# Patient Record
Sex: FEMALE | Race: AMERICAN INDIAN OR ALASKA NATIVE | NOT HISPANIC OR LATINO | URBAN - METROPOLITAN AREA
[De-identification: names, ages, dates, MRNs, and addresses within clinical notes are randomized per-mention and may not be internally consistent; named-entity substitution may affect disease eponyms.]

---

## 2017-03-25 ENCOUNTER — OUTPATIENT (OUTPATIENT)
Dept: OUTPATIENT SERVICES | Facility: HOSPITAL | Age: 82
LOS: 1 days | Discharge: HOME | End: 2017-03-25

## 2017-06-27 DIAGNOSIS — E78.2 MIXED HYPERLIPIDEMIA: ICD-10-CM

## 2017-06-27 DIAGNOSIS — Z13.0 ENCOUNTER FOR SCREENING FOR DISEASES OF THE BLOOD AND BLOOD-FORMING ORGANS AND CERTAIN DISORDERS INVOLVING THE IMMUNE MECHANISM: ICD-10-CM

## 2017-06-27 DIAGNOSIS — Z13.228 ENCOUNTER FOR SCREENING FOR OTHER METABOLIC DISORDERS: ICD-10-CM

## 2017-06-27 DIAGNOSIS — D50.9 IRON DEFICIENCY ANEMIA, UNSPECIFIED: ICD-10-CM

## 2017-06-27 DIAGNOSIS — E55.9 VITAMIN D DEFICIENCY, UNSPECIFIED: ICD-10-CM

## 2017-06-27 DIAGNOSIS — Z00.00 ENCOUNTER FOR GENERAL ADULT MEDICAL EXAMINATION WITHOUT ABNORMAL FINDINGS: ICD-10-CM

## 2017-06-27 DIAGNOSIS — E61.1 IRON DEFICIENCY: ICD-10-CM

## 2017-06-27 DIAGNOSIS — E11.9 TYPE 2 DIABETES MELLITUS WITHOUT COMPLICATIONS: ICD-10-CM

## 2017-06-27 DIAGNOSIS — Z13.220 ENCOUNTER FOR SCREENING FOR LIPOID DISORDERS: ICD-10-CM

## 2017-06-27 DIAGNOSIS — Z13.21 ENCOUNTER FOR SCREENING FOR NUTRITIONAL DISORDER: ICD-10-CM

## 2018-04-21 ENCOUNTER — OUTPATIENT (OUTPATIENT)
Dept: OUTPATIENT SERVICES | Facility: HOSPITAL | Age: 83
LOS: 1 days | Discharge: HOME | End: 2018-04-21

## 2018-04-21 DIAGNOSIS — R97.0 ELEVATED CARCINOEMBRYONIC ANTIGEN [CEA]: ICD-10-CM

## 2018-04-21 DIAGNOSIS — N39.0 URINARY TRACT INFECTION, SITE NOT SPECIFIED: ICD-10-CM

## 2018-04-21 DIAGNOSIS — Z13.0 ENCOUNTER FOR SCREENING FOR DISEASES OF THE BLOOD AND BLOOD-FORMING ORGANS AND CERTAIN DISORDERS INVOLVING THE IMMUNE MECHANISM: ICD-10-CM

## 2018-04-21 DIAGNOSIS — R80.9 PROTEINURIA, UNSPECIFIED: ICD-10-CM

## 2018-04-21 DIAGNOSIS — Z13.220 ENCOUNTER FOR SCREENING FOR LIPOID DISORDERS: ICD-10-CM

## 2018-04-21 DIAGNOSIS — Z13.21 ENCOUNTER FOR SCREENING FOR NUTRITIONAL DISORDER: ICD-10-CM

## 2018-04-21 DIAGNOSIS — E11.9 TYPE 2 DIABETES MELLITUS WITHOUT COMPLICATIONS: ICD-10-CM

## 2018-04-21 DIAGNOSIS — Z00.00 ENCOUNTER FOR GENERAL ADULT MEDICAL EXAMINATION WITHOUT ABNORMAL FINDINGS: ICD-10-CM

## 2019-03-12 ENCOUNTER — OUTPATIENT (OUTPATIENT)
Dept: OUTPATIENT SERVICES | Facility: HOSPITAL | Age: 84
LOS: 1 days | Discharge: HOME | End: 2019-03-12

## 2019-03-12 DIAGNOSIS — Z13.220 ENCOUNTER FOR SCREENING FOR LIPOID DISORDERS: ICD-10-CM

## 2019-03-12 DIAGNOSIS — R79.89 OTHER SPECIFIED ABNORMAL FINDINGS OF BLOOD CHEMISTRY: ICD-10-CM

## 2019-03-12 DIAGNOSIS — Z13.21 ENCOUNTER FOR SCREENING FOR NUTRITIONAL DISORDER: ICD-10-CM

## 2019-03-12 DIAGNOSIS — Z13.0 ENCOUNTER FOR SCREENING FOR DISEASES OF THE BLOOD AND BLOOD-FORMING ORGANS AND CERTAIN DISORDERS INVOLVING THE IMMUNE MECHANISM: ICD-10-CM

## 2019-03-12 DIAGNOSIS — E11.9 TYPE 2 DIABETES MELLITUS WITHOUT COMPLICATIONS: ICD-10-CM

## 2019-03-12 DIAGNOSIS — Z00.00 ENCOUNTER FOR GENERAL ADULT MEDICAL EXAMINATION WITHOUT ABNORMAL FINDINGS: ICD-10-CM

## 2019-03-12 DIAGNOSIS — R79.1 ABNORMAL COAGULATION PROFILE: ICD-10-CM

## 2019-03-12 DIAGNOSIS — N39.0 URINARY TRACT INFECTION, SITE NOT SPECIFIED: ICD-10-CM

## 2019-03-12 DIAGNOSIS — R94.6 ABNORMAL RESULTS OF THYROID FUNCTION STUDIES: ICD-10-CM

## 2019-03-12 DIAGNOSIS — R97.0 ELEVATED CARCINOEMBRYONIC ANTIGEN [CEA]: ICD-10-CM

## 2019-03-12 DIAGNOSIS — R73.09 OTHER ABNORMAL GLUCOSE: ICD-10-CM

## 2019-10-29 ENCOUNTER — OUTPATIENT (OUTPATIENT)
Dept: OUTPATIENT SERVICES | Facility: HOSPITAL | Age: 84
LOS: 1 days | Discharge: HOME | End: 2019-10-29

## 2019-10-29 DIAGNOSIS — N39.0 URINARY TRACT INFECTION, SITE NOT SPECIFIED: ICD-10-CM

## 2019-10-29 DIAGNOSIS — R82.90 UNSPECIFIED ABNORMAL FINDINGS IN URINE: ICD-10-CM

## 2020-04-07 ENCOUNTER — EMERGENCY (EMERGENCY)
Facility: HOSPITAL | Age: 85
LOS: 0 days | Discharge: HOME | End: 2020-04-07
Attending: EMERGENCY MEDICINE | Admitting: EMERGENCY MEDICINE
Payer: MEDICARE

## 2020-04-07 VITALS
HEART RATE: 96 BPM | DIASTOLIC BLOOD PRESSURE: 66 MMHG | OXYGEN SATURATION: 95 % | SYSTOLIC BLOOD PRESSURE: 132 MMHG | RESPIRATION RATE: 22 BRPM | TEMPERATURE: 101 F

## 2020-04-07 VITALS — OXYGEN SATURATION: 96 %

## 2020-04-07 DIAGNOSIS — I10 ESSENTIAL (PRIMARY) HYPERTENSION: ICD-10-CM

## 2020-04-07 DIAGNOSIS — R06.02 SHORTNESS OF BREATH: ICD-10-CM

## 2020-04-07 DIAGNOSIS — U07.1 COVID-19: ICD-10-CM

## 2020-04-07 DIAGNOSIS — E11.9 TYPE 2 DIABETES MELLITUS WITHOUT COMPLICATIONS: ICD-10-CM

## 2020-04-07 DIAGNOSIS — R55 SYNCOPE AND COLLAPSE: ICD-10-CM

## 2020-04-07 DIAGNOSIS — R50.9 FEVER, UNSPECIFIED: ICD-10-CM

## 2020-04-07 LAB
ALBUMIN SERPL ELPH-MCNC: 4.3 G/DL — SIGNIFICANT CHANGE UP (ref 3.5–5.2)
ALP SERPL-CCNC: 54 U/L — SIGNIFICANT CHANGE UP (ref 30–115)
ALT FLD-CCNC: 44 U/L — HIGH (ref 0–41)
ANION GAP SERPL CALC-SCNC: 15 MMOL/L — HIGH (ref 7–14)
AST SERPL-CCNC: 55 U/L — HIGH (ref 0–41)
BASOPHILS # BLD AUTO: 0.01 K/UL — SIGNIFICANT CHANGE UP (ref 0–0.2)
BASOPHILS NFR BLD AUTO: 0.2 % — SIGNIFICANT CHANGE UP (ref 0–1)
BILIRUB SERPL-MCNC: 0.3 MG/DL — SIGNIFICANT CHANGE UP (ref 0.2–1.2)
BUN SERPL-MCNC: 15 MG/DL — SIGNIFICANT CHANGE UP (ref 10–20)
CALCIUM SERPL-MCNC: 9.1 MG/DL — SIGNIFICANT CHANGE UP (ref 8.5–10.1)
CHLORIDE SERPL-SCNC: 96 MMOL/L — LOW (ref 98–110)
CO2 SERPL-SCNC: 24 MMOL/L — SIGNIFICANT CHANGE UP (ref 17–32)
CREAT SERPL-MCNC: 1.2 MG/DL — SIGNIFICANT CHANGE UP (ref 0.7–1.5)
EOSINOPHIL # BLD AUTO: 0.01 K/UL — SIGNIFICANT CHANGE UP (ref 0–0.7)
EOSINOPHIL NFR BLD AUTO: 0.2 % — SIGNIFICANT CHANGE UP (ref 0–8)
GLUCOSE SERPL-MCNC: 130 MG/DL — HIGH (ref 70–99)
HCT VFR BLD CALC: 34.4 % — LOW (ref 37–47)
HGB BLD-MCNC: 11.6 G/DL — LOW (ref 12–16)
IMM GRANULOCYTES NFR BLD AUTO: 0.4 % — HIGH (ref 0.1–0.3)
LYMPHOCYTES # BLD AUTO: 0.88 K/UL — LOW (ref 1.2–3.4)
LYMPHOCYTES # BLD AUTO: 19.2 % — LOW (ref 20.5–51.1)
MCHC RBC-ENTMCNC: 33.2 PG — HIGH (ref 27–31)
MCHC RBC-ENTMCNC: 33.7 G/DL — SIGNIFICANT CHANGE UP (ref 32–37)
MCV RBC AUTO: 98.6 FL — SIGNIFICANT CHANGE UP (ref 81–99)
MONOCYTES # BLD AUTO: 0.49 K/UL — SIGNIFICANT CHANGE UP (ref 0.1–0.6)
MONOCYTES NFR BLD AUTO: 10.7 % — HIGH (ref 1.7–9.3)
NEUTROPHILS # BLD AUTO: 3.18 K/UL — SIGNIFICANT CHANGE UP (ref 1.4–6.5)
NEUTROPHILS NFR BLD AUTO: 69.3 % — SIGNIFICANT CHANGE UP (ref 42.2–75.2)
NRBC # BLD: 0 /100 WBCS — SIGNIFICANT CHANGE UP (ref 0–0)
PLATELET # BLD AUTO: 155 K/UL — SIGNIFICANT CHANGE UP (ref 130–400)
POTASSIUM SERPL-MCNC: 4.1 MMOL/L — SIGNIFICANT CHANGE UP (ref 3.5–5)
POTASSIUM SERPL-SCNC: 4.1 MMOL/L — SIGNIFICANT CHANGE UP (ref 3.5–5)
PROT SERPL-MCNC: 7.4 G/DL — SIGNIFICANT CHANGE UP (ref 6–8)
RBC # BLD: 3.49 M/UL — LOW (ref 4.2–5.4)
RBC # FLD: 12.8 % — SIGNIFICANT CHANGE UP (ref 11.5–14.5)
SODIUM SERPL-SCNC: 135 MMOL/L — SIGNIFICANT CHANGE UP (ref 135–146)
TROPONIN T SERPL-MCNC: <0.01 NG/ML — SIGNIFICANT CHANGE UP
WBC # BLD: 4.59 K/UL — LOW (ref 4.8–10.8)
WBC # FLD AUTO: 4.59 K/UL — LOW (ref 4.8–10.8)

## 2020-04-07 PROCEDURE — 93010 ELECTROCARDIOGRAM REPORT: CPT

## 2020-04-07 PROCEDURE — 71045 X-RAY EXAM CHEST 1 VIEW: CPT | Mod: 26

## 2020-04-07 PROCEDURE — 99285 EMERGENCY DEPT VISIT HI MDM: CPT | Mod: CS

## 2020-04-07 RX ORDER — SODIUM CHLORIDE 9 MG/ML
500 INJECTION INTRAMUSCULAR; INTRAVENOUS; SUBCUTANEOUS ONCE
Refills: 0 | Status: COMPLETED | OUTPATIENT
Start: 2020-04-07 | End: 2020-04-07

## 2020-04-07 RX ADMIN — SODIUM CHLORIDE 500 MILLILITER(S): 9 INJECTION INTRAMUSCULAR; INTRAVENOUS; SUBCUTANEOUS at 12:03

## 2020-04-07 NOTE — ED ADULT NURSE NOTE - NSIMPLEMENTINTERV_GEN_ALL_ED
Implemented All Fall with Harm Risk Interventions:  [place] to call system. Call bell, personal items and telephone within reach. Instruct patient to call for assistance. Room bathroom lighting operational. Non-slip footwear when patient is off stretcher. Physically safe environment: no spills, clutter or unnecessary equipment. Stretcher in lowest position, wheels locked, appropriate side rails in place. Provide visual cue, wrist band, yellow gown, etc. Monitor gait and stability. Monitor for mental status changes and reorient to person, place, and time. Review medications for side effects contributing to fall risk. Reinforce activity limits and safety measures with patient and family. Provide visual clues: red socks.

## 2020-04-07 NOTE — ED PROVIDER NOTE - NSFOLLOWUPINSTRUCTIONS_ED_ALL_ED_FT
Please follow up with your Primary Care Doctor in 1-3 days to monitor progress of your symptoms.     Syncope    Syncope is when you temporarily lose consciousness, also called fainting or passing out. It is caused by a sudden decrease in blood flow to the brain. Even though most causes of syncope are not dangerous, syncope can be a sign of a serious medical problem. Signs that you may be about to faint include feeling dizzy, lightheaded, nauseas, visual changes, cold/clammy skin. Do not drive, use machinery, or play sports until your health care provider says it is okay.    SEEK IMMEDIATE MEDICAL CARE IF YOU HAVE THE FOLLOWING SYMPTOMS: severe headache, pain in your chest/abdomen/back, bleeding from your mouth or rectum, palpitations, shortness of breath, pain with breathing, seizure, confusion, trouble walking.    Viral Illness, Adult  Viruses are tiny germs that can get into a person's body and cause illness. There are many different types of viruses, and they cause many types of illness. Viral illnesses can range from mild to severe. They can affect various parts of the body.    Common illnesses that are caused by a virus include colds and the flu. Viral illnesses also include serious conditions such as HIV/AIDS (human immunodeficiency virus/acquired immunodeficiency syndrome). A few viruses have been linked to certain cancers.    What are the causes?  Many types of viruses can cause illness. Viruses invade cells in your body, multiply, and cause the infected cells to malfunction or die. When the cell dies, it releases more of the virus. When this happens, you develop symptoms of the illness, and the virus continues to spread to other cells. If the virus takes over the function of the cell, it can cause the cell to divide and grow out of control, as is the case when a virus causes cancer.    Different viruses get into the body in different ways. You can get a virus by:    Swallowing food or water that is contaminated with the virus.  Breathing in droplets that have been coughed or sneezed into the air by an infected person.  Touching a surface that has been contaminated with the virus and then touching your eyes, nose, or mouth.  Being bitten by an insect or animal that carries the virus.  Having sexual contact with a person who is infected with the virus.  Being exposed to blood or fluids that contain the virus, either through an open cut or during a transfusion.    If a virus enters your body, your body's defense system (immune system) will try to fight the virus. You may be at higher risk for a viral illness if your immune system is weak.    What are the signs or symptoms?  Symptoms vary depending on the type of virus and the location of the cells that it invades. Common symptoms of the main types of viral illnesses include:    Cold and flu viruses     Fever.  Headache.  Sore throat.  Muscle aches.  Nasal congestion.  Cough.  Digestive system (gastrointestinal) viruses     Fever.  Abdominal pain.  Nausea.  Diarrhea.  Liver viruses (hepatitis)     Loss of appetite.  Tiredness.  Yellowing of the skin (jaundice).  Brain and spinal cord viruses     Fever.  Headache.  Stiff neck.  Nausea and vomiting.  Confusion or sleepiness.  Skin viruses     Warts.  Itching.  Rash.  Sexually transmitted viruses     Discharge.  Swelling.  Redness.  Rash.  How is this treated?  Viruses can be difficult to treat because they live within cells. Antibiotic medicines do not treat viruses because these drugs do not get inside cells. Treatment for a viral illness may include:    Resting and drinking plenty of fluids.  Medicines to relieve symptoms. These can include over-the-counter medicine for pain and fever, medicines for cough or congestion, and medicines to relieve diarrhea.  Antiviral medicines. These drugs are available only for certain types of viruses. They may help reduce flu symptoms if taken early. There are also many antiviral medicines for hepatitis and HIV/AIDS.    Some viral illnesses can be prevented with vaccinations. A common example is the flu shot.    Follow these instructions at home:  Medicines     Take over-the-counter and prescription medicines only as told by your health care provider.  If you were prescribed an antiviral medicine, take it as told by your health care provider. Do not stop taking the medicine even if you start to feel better.  Be aware of when antibiotics are needed and when they are not needed. Antibiotics do not treat viruses. If your health care provider thinks that you may have a bacterial infection as well as a viral infection, you may get an antibiotic.    Do not ask for an antibiotic prescription if you have been diagnosed with a viral illness. That will not make your illness go away faster.  Frequently taking antibiotics when they are not needed can lead to antibiotic resistance. When this develops, the medicine no longer works against the bacteria that it normally fights.    General instructions     Drink enough fluids to keep your urine clear or pale yellow.  Rest as much as possible.  Return to your normal activities as told by your health care provider. Ask your health care provider what activities are safe for you.  Keep all follow-up visits as told by your health care provider. This is important.  How is this prevented?  Take these actions to reduce your risk of viral infection:    Eat a healthy diet and get enough rest.  Wash your hands often with soap and water. This is especially important when you are in public places. If soap and water are not available, use hand .  Avoid close contact with friends and family who have a viral illness.  If you travel to areas where viral gastrointestinal infection is common, avoid drinking water or eating raw food.  Keep your immunizations up to date. Get a flu shot every year as told by your health care provider.  Do not share toothbrushes, nail clippers, razors, or needles with other people.  Always practice safe sex.    Contact a health care provider if:  You have symptoms of a viral illness that do not go away.  Your symptoms come back after going away.  Your symptoms get worse.  Get help right away if:  You have trouble breathing.  You have a severe headache or a stiff neck.  You have severe vomiting or abdominal pain.  This information is not intended to replace advice given to you by your health care provider. Make sure you discuss any questions you have with your health care provider.    You are being discharged with viral illness diagnosis and do not require hospitalization.  At this time, only patients who are being hospitalized are tested for COVID-19.    If you are well enough to be discharged home and are not in a high risk group to be admitted, you should care for yourself at home exactly like you would if you have Influenza “flu”. Follow all the standard guidelines about washing your hands, covering your cough, etc. If you feel unwell, stay home, rest and drink plenty of clear fluids. Keep track of your symptoms. You should return to the Emergency Department if you develop worse symptoms, trouble breathing, chest pain, and/or a fever that doesn’t improve with over the counter medications.    Please consider going through the drive-through testing unless you are severely ill and need to go to the ED.  -through testing is available at various location, including [place].  Call [facility] at  [phone] to make an appointment.    How to Set Up Your Home for Self-Quarantine or Self-Isolation    Please refer to this helpful video.   [email or web address]

## 2020-04-07 NOTE — ED ADULT NURSE NOTE - OBJECTIVE STATEMENT
covid (+) presenting after syncopal episode. Pt was having a bowel movement this morning when she passed out. daughter states no injuries sustained, episode lasted 5-10 seconds

## 2020-04-07 NOTE — ED PROVIDER NOTE - PATIENT PORTAL LINK FT
You can access the FollowMyHealth Patient Portal offered by [facility] by registering at the following website: [email or web address]. By joining [facility]’s FollowMyHealth portal, you will also be able to view your health information using other applications (apps) compatible with our system.

## 2020-04-07 NOTE — ED PROVIDER NOTE - PHYSICAL EXAMINATION
Vital Signs: I have reviewed the initial vital signs. (+) Febrile.   Constitutional: well-nourished, appears stated age, no acute distress  Cardiovascular: regular rate, regular rhythm, well-perfused extremities  Respiratory: unlabored respiratory effort, clear to auscultation bilaterally  Gastrointestinal: soft, non-tender abdomen  Musculoskeletal: supple neck, no lower extremity edema  Integumentary: warm, dry, no rash  Neurologic: awake, alert, extremities’ motor and sensory functions grossly intact  Psychiatric: appropriate mood, appropriate affect

## 2020-04-07 NOTE — ED PROVIDER NOTE - PROGRESS NOTE DETAILS
Pt satting 96% on RA at rest. EKG normal, trop neg. Pt's XR consistent with viral pneumonia. Pt satting well, agreeable to discharge.

## 2020-04-07 NOTE — ED PROVIDER NOTE - OBJECTIVE STATEMENT
The pt is a 89y F w/ hx of DM, HTN, covid (+) presenting after syncopal episode. Pt was having a bowel movement this morning when she passed out. Daughter was there and caught here. Pt did not sustain any injuries. Episode lasted about 5-10 seconds. Daughter says pt has not been eating/drinking well since yesterday. Endorses intermittent fever. Pulse ox at home >95%. Taking azithromycin and Plaquenil. Denies headache, chest pain, SOB, N/V, abdominal pain, diarrhea. No allergies. Never smoker.

## 2020-04-07 NOTE — ED PROVIDER NOTE - ATTENDING CONTRIBUTION TO CARE
89 yo F with PMH DM, HTN, COVID + presents to Ed for weakness and decreased PO intake. She has not had any SOB. Her daughter has been taking her pulse ox at home and it has been >95%.   No nausea, vomiting, diarrhea, CP.     On PE patient is in NAD. Cardio RRR. Lungs CTA. RR normal. Abdomen SNTND     Will do basic labs to check electrolytes and CXR

## 2020-04-27 ENCOUNTER — EMERGENCY (EMERGENCY)
Facility: HOSPITAL | Age: 85
LOS: 0 days | Discharge: HOME | End: 2020-04-27
Admitting: INTERNAL MEDICINE

## 2020-04-27 ENCOUNTER — INPATIENT (INPATIENT)
Facility: HOSPITAL | Age: 85
LOS: 0 days | Discharge: HOME | End: 2020-04-28
Attending: INTERNAL MEDICINE | Admitting: INTERNAL MEDICINE
Payer: MEDICARE

## 2020-04-27 VITALS
DIASTOLIC BLOOD PRESSURE: 82 MMHG | HEART RATE: 91 BPM | TEMPERATURE: 98 F | RESPIRATION RATE: 20 BRPM | OXYGEN SATURATION: 100 % | SYSTOLIC BLOOD PRESSURE: 191 MMHG

## 2020-04-27 DIAGNOSIS — G93.41 METABOLIC ENCEPHALOPATHY: ICD-10-CM

## 2020-04-27 DIAGNOSIS — R41.82 ALTERED MENTAL STATUS, UNSPECIFIED: ICD-10-CM

## 2020-04-27 DIAGNOSIS — E11.9 TYPE 2 DIABETES MELLITUS WITHOUT COMPLICATIONS: ICD-10-CM

## 2020-04-27 DIAGNOSIS — R60.0 LOCALIZED EDEMA: ICD-10-CM

## 2020-04-27 DIAGNOSIS — I10 ESSENTIAL (PRIMARY) HYPERTENSION: ICD-10-CM

## 2020-04-27 DIAGNOSIS — U07.1 COVID-19: ICD-10-CM

## 2020-04-27 DIAGNOSIS — R09.89 OTHER SPECIFIED SYMPTOMS AND SIGNS INVOLVING THE CIRCULATORY AND RESPIRATORY SYSTEMS: ICD-10-CM

## 2020-04-27 DIAGNOSIS — K21.9 GASTRO-ESOPHAGEAL REFLUX DISEASE WITHOUT ESOPHAGITIS: ICD-10-CM

## 2020-04-27 LAB
ALBUMIN SERPL ELPH-MCNC: 3.9 G/DL — SIGNIFICANT CHANGE UP (ref 3.5–5.2)
ALP SERPL-CCNC: 64 U/L — SIGNIFICANT CHANGE UP (ref 30–115)
ALT FLD-CCNC: 26 U/L — SIGNIFICANT CHANGE UP (ref 0–41)
ANION GAP SERPL CALC-SCNC: 12 MMOL/L — SIGNIFICANT CHANGE UP (ref 7–14)
APPEARANCE UR: CLEAR — SIGNIFICANT CHANGE UP
AST SERPL-CCNC: 40 U/L — SIGNIFICANT CHANGE UP (ref 0–41)
BASOPHILS # BLD AUTO: 0.05 K/UL — SIGNIFICANT CHANGE UP (ref 0–0.2)
BASOPHILS NFR BLD AUTO: 0.7 % — SIGNIFICANT CHANGE UP (ref 0–1)
BILIRUB SERPL-MCNC: 0.4 MG/DL — SIGNIFICANT CHANGE UP (ref 0.2–1.2)
BILIRUB UR-MCNC: NEGATIVE — SIGNIFICANT CHANGE UP
BUN SERPL-MCNC: 9 MG/DL — LOW (ref 10–20)
CALCIUM SERPL-MCNC: 8.8 MG/DL — SIGNIFICANT CHANGE UP (ref 8.5–10.1)
CHLORIDE SERPL-SCNC: 90 MMOL/L — LOW (ref 98–110)
CO2 SERPL-SCNC: 28 MMOL/L — SIGNIFICANT CHANGE UP (ref 17–32)
COLOR SPEC: SIGNIFICANT CHANGE UP
CREAT SERPL-MCNC: 1 MG/DL — SIGNIFICANT CHANGE UP (ref 0.7–1.5)
D DIMER BLD IA.RAPID-MCNC: 2576 NG/ML DDU — HIGH (ref 0–230)
DIFF PNL FLD: NEGATIVE — SIGNIFICANT CHANGE UP
EOSINOPHIL # BLD AUTO: 0.19 K/UL — SIGNIFICANT CHANGE UP (ref 0–0.7)
EOSINOPHIL NFR BLD AUTO: 2.7 % — SIGNIFICANT CHANGE UP (ref 0–8)
GLUCOSE BLDC GLUCOMTR-MCNC: 123 MG/DL — HIGH (ref 70–99)
GLUCOSE SERPL-MCNC: 120 MG/DL — HIGH (ref 70–99)
GLUCOSE UR QL: NEGATIVE — SIGNIFICANT CHANGE UP
HCT VFR BLD CALC: 26.5 % — LOW (ref 37–47)
HGB BLD-MCNC: 9.2 G/DL — LOW (ref 12–16)
IMM GRANULOCYTES NFR BLD AUTO: 0.8 % — HIGH (ref 0.1–0.3)
KETONES UR-MCNC: NEGATIVE — SIGNIFICANT CHANGE UP
LDH SERPL L TO P-CCNC: 534 — HIGH (ref 50–242)
LEUKOCYTE ESTERASE UR-ACNC: NEGATIVE — SIGNIFICANT CHANGE UP
LYMPHOCYTES # BLD AUTO: 1.85 K/UL — SIGNIFICANT CHANGE UP (ref 1.2–3.4)
LYMPHOCYTES # BLD AUTO: 26.1 % — SIGNIFICANT CHANGE UP (ref 20.5–51.1)
MCHC RBC-ENTMCNC: 34.7 G/DL — SIGNIFICANT CHANGE UP (ref 32–37)
MCHC RBC-ENTMCNC: 34.7 PG — HIGH (ref 27–31)
MCV RBC AUTO: 100 FL — HIGH (ref 81–99)
MONOCYTES # BLD AUTO: 0.84 K/UL — HIGH (ref 0.1–0.6)
MONOCYTES NFR BLD AUTO: 11.8 % — HIGH (ref 1.7–9.3)
NEUTROPHILS # BLD AUTO: 4.11 K/UL — SIGNIFICANT CHANGE UP (ref 1.4–6.5)
NEUTROPHILS NFR BLD AUTO: 57.9 % — SIGNIFICANT CHANGE UP (ref 42.2–75.2)
NITRITE UR-MCNC: NEGATIVE — SIGNIFICANT CHANGE UP
NRBC # BLD: 0 /100 WBCS — SIGNIFICANT CHANGE UP (ref 0–0)
NT-PROBNP SERPL-SCNC: 431 PG/ML — HIGH (ref 0–300)
PH UR: 7 — SIGNIFICANT CHANGE UP (ref 5–8)
PLATELET # BLD AUTO: 284 K/UL — SIGNIFICANT CHANGE UP (ref 130–400)
POTASSIUM SERPL-MCNC: 5.8 MMOL/L — HIGH (ref 3.5–5)
POTASSIUM SERPL-SCNC: 5.8 MMOL/L — HIGH (ref 3.5–5)
PROT SERPL-MCNC: 7 G/DL — SIGNIFICANT CHANGE UP (ref 6–8)
PROT UR-MCNC: NEGATIVE — SIGNIFICANT CHANGE UP
RBC # BLD: 2.65 M/UL — LOW (ref 4.2–5.4)
RBC # FLD: 13.8 % — SIGNIFICANT CHANGE UP (ref 11.5–14.5)
SODIUM SERPL-SCNC: 130 MMOL/L — LOW (ref 135–146)
SP GR SPEC: 1.01 — LOW (ref 1.01–1.02)
TROPONIN T SERPL-MCNC: <0.01 NG/ML — SIGNIFICANT CHANGE UP
UROBILINOGEN FLD QL: SIGNIFICANT CHANGE UP
WBC # BLD: 7.1 K/UL — SIGNIFICANT CHANGE UP (ref 4.8–10.8)
WBC # FLD AUTO: 7.1 K/UL — SIGNIFICANT CHANGE UP (ref 4.8–10.8)

## 2020-04-27 PROCEDURE — 93010 ELECTROCARDIOGRAM REPORT: CPT

## 2020-04-27 PROCEDURE — 99285 EMERGENCY DEPT VISIT HI MDM: CPT | Mod: CS,GC

## 2020-04-27 PROCEDURE — 71045 X-RAY EXAM CHEST 1 VIEW: CPT | Mod: 26

## 2020-04-27 PROCEDURE — 70450 CT HEAD/BRAIN W/O DYE: CPT | Mod: 26

## 2020-04-27 PROCEDURE — 99223 1ST HOSP IP/OBS HIGH 75: CPT | Mod: AI,GC

## 2020-04-27 RX ORDER — CHLORHEXIDINE GLUCONATE 213 G/1000ML
1 SOLUTION TOPICAL
Refills: 0 | Status: DISCONTINUED | OUTPATIENT
Start: 2020-04-27 | End: 2020-04-28

## 2020-04-27 RX ORDER — OMEPRAZOLE 10 MG/1
0 CAPSULE, DELAYED RELEASE ORAL
Qty: 0 | Refills: 0 | DISCHARGE

## 2020-04-27 RX ORDER — SIMVASTATIN 20 MG/1
0 TABLET, FILM COATED ORAL
Qty: 0 | Refills: 0 | DISCHARGE

## 2020-04-27 RX ORDER — ENOXAPARIN SODIUM 100 MG/ML
40 INJECTION SUBCUTANEOUS DAILY
Refills: 0 | Status: DISCONTINUED | OUTPATIENT
Start: 2020-04-27 | End: 2020-04-28

## 2020-04-27 RX ORDER — ENOXAPARIN SODIUM 100 MG/ML
40 INJECTION SUBCUTANEOUS DAILY
Refills: 0 | Status: DISCONTINUED | OUTPATIENT
Start: 2020-04-27 | End: 2020-04-27

## 2020-04-27 RX ORDER — ENOXAPARIN SODIUM 100 MG/ML
40 INJECTION SUBCUTANEOUS EVERY 12 HOURS
Refills: 0 | Status: DISCONTINUED | OUTPATIENT
Start: 2020-04-27 | End: 2020-04-27

## 2020-04-27 RX ORDER — FOLIC ACID 0.8 MG
1 TABLET ORAL DAILY
Refills: 0 | Status: DISCONTINUED | OUTPATIENT
Start: 2020-04-27 | End: 2020-04-28

## 2020-04-27 RX ORDER — PANTOPRAZOLE SODIUM 20 MG/1
40 TABLET, DELAYED RELEASE ORAL
Refills: 0 | Status: DISCONTINUED | OUTPATIENT
Start: 2020-04-27 | End: 2020-04-28

## 2020-04-27 RX ORDER — LOSARTAN POTASSIUM 100 MG/1
0 TABLET, FILM COATED ORAL
Qty: 0 | Refills: 0 | DISCHARGE

## 2020-04-27 RX ORDER — SODIUM CHLORIDE 9 MG/ML
1000 INJECTION, SOLUTION INTRAVENOUS
Refills: 0 | Status: DISCONTINUED | OUTPATIENT
Start: 2020-04-27 | End: 2020-04-28

## 2020-04-27 RX ORDER — SIMVASTATIN 20 MG/1
40 TABLET, FILM COATED ORAL AT BEDTIME
Refills: 0 | Status: DISCONTINUED | OUTPATIENT
Start: 2020-04-27 | End: 2020-04-28

## 2020-04-27 RX ORDER — ACETAMINOPHEN 500 MG
650 TABLET ORAL EVERY 6 HOURS
Refills: 0 | Status: DISCONTINUED | OUTPATIENT
Start: 2020-04-27 | End: 2020-04-28

## 2020-04-27 RX ORDER — NIFEDIPINE 30 MG
30 TABLET, EXTENDED RELEASE 24 HR ORAL DAILY
Refills: 0 | Status: DISCONTINUED | OUTPATIENT
Start: 2020-04-27 | End: 2020-04-28

## 2020-04-27 RX ADMIN — Medication 1 MILLIGRAM(S): at 23:00

## 2020-04-27 RX ADMIN — Medication 30 MILLIGRAM(S): at 23:00

## 2020-04-27 RX ADMIN — SODIUM CHLORIDE 75 MILLILITER(S): 9 INJECTION, SOLUTION INTRAVENOUS at 23:46

## 2020-04-27 NOTE — ED PROVIDER NOTE - PROGRESS NOTE DETAILS
BI: COVID swab sent. Hb noted. On rectal exam chaperoned by PCA Shavoya, external hemorrhoids, no brb or melena on MARLA.

## 2020-04-27 NOTE — H&P ADULT - ASSESSMENT
**** PLEASE TRY TO REACH FAMILY IN AM TO CONFIRM EXACT HX AND MED REC AS PT UNABLE TO GIVE DETAILS OF HER SITUATION ****    89y/o F with PMH HTN, DM with known COVID + 4/2 presents with encephalopathy.     #Encephalopathy of unknown source vs. underlying Dementia?  #Hyponatremia  - DDX: metabolic (hyponatremia?) vs. dementia vs. toxic/infectious (COVID related)  - CTH neg  - unable to reach family (phone just rings, no answering box), unknown hx  - Na 130 on admit, gentle hydrate w/ LR @75  - f/u BCx, UCx, TSH, B12, folate  - f/u serum osm, urine lytes  - f/u Urine drug/serum tox  - consider LP in AM?  - consider neuro consult?    #Leg swelling  - f/u duplex  - lovenox 40 BID    #Macrocytic anemia  - Hbg 9.1, drop from 11 prior  - check FOBT, B12, folate  - start folic acid supplement for now     #COVID+ve Status, no hypoxia  - CXR stable, no change from 4/7 (f/u final read)  - f/u COVID19   - QTc   - 4/27: Ddimer 2576,   - f/u procal, CRP, ferritin   - no need for HCQ/abbx for now    #HTN, poorly controlled  - unknown med rec  - prior on losartan 25 (but last picked up in 2019?)  - will start on nifedipine 30 for now (no ARB given K)    #HLD- c/w simva 40 (as per prior rec)    #MISC  - DVT Ppx: lovenox BID  - GI ppx: PPI  - Diet: DASH, CC  - Activity: OOBTC, fall precautions **** PLEASE TRY TO REACH FAMILY IN AM TO CONFIRM EXACT HX AND MED REC AS PT UNABLE TO GIVE DETAILS OF HER SITUATION ****    89y/o F with PMH HTN, DM with known COVID + 4/2 presents with encephalopathy.     #Encephalopathy of unknown source vs. underlying Dementia?  #Hyponatremia  - DDX: metabolic (hyponatremia?) vs. dementia vs. toxic/infectious (COVID related)  - pt currently AAOx3 (knows in hospital, daughter brought her b/c she was a little confused), unclear what were her sxs  - unable to reach family (phone just rings, no answering box)   - CTH neg  - Na 130 on admit, gentle hydrate w/ LR @75  - f/u BCx, UCx, TSH, B12, folate  - f/u serum osm, urine lytes  - f/u Urine drug/serum tox  - consider LP in AM?  - consider neuro consult?    #Macrocytic anemia  - Hbg 9.1, drop from 11 prior  - check FOBT, B12, folate  - start folic acid supplement for now     #COVID+ve Status, no hypoxia  - CXR stable, no change from 4/7 (f/u final read)  - f/u COVID19   - QTc   - 4/27: Ddimer 2576,   - f/u procal, CRP, ferritin   - no need for HCQ/abbx for now    #HTN, poorly controlled  - unknown med rec  - prior on losartan 25 (but last picked up in 2019?)  - will start on nifedipine 30 for now (no ARB given K)    #HLD- c/w simva 40 (as per prior rec)    #MISC  - DVT Ppx: lovenox qd  - GI ppx: PPI  - Diet: DASH, CC  - Activity: OOBTC, fall precautions

## 2020-04-27 NOTE — ED PROVIDER NOTE - OBJECTIVE STATEMENT
89 y.o F w/ pmhx HTN, DM, GERD [place] [facility] [place]. Pt's daughter noticed pt to be altered yesterday morning and daughter also noticed pt to have LE swelling. [place] [facility] [place] [facility] [place] that they were not able to get a proper ROS or history as pt is deaf and not following commands, and daughter not able to provide more history. Daughters contact is [phone], however, daughter not picking up the phone. Only other history is pt tested positive for COVID 28 days ago. When asking pt, she states she just doesn't feel well but doesn't state that anything hurts.

## 2020-04-27 NOTE — H&P ADULT - NSHPPHYSICALEXAM_GEN_ALL_CORE
GENERAL: NAD, well-developed  HEENT:  Atraumatic, Normocephalic; EOMI, PERRLA, conjunctiva pink, sclera white, Supple, No JVD, thyromegally or LYAD appreciated  CHEST/LUNG: Clear to auscultation bilaterally; No wheeze, ronchi or rales  HEART: Regular rate and rhythm; No murmurs, rubs, or gallops  ABDOMEN: Soft, Nontender, Nondistended; Bowel sounds present  EXTREMITIES:  2+ Peripheral Pulses, No peripheral pitting edema  PSYCH: AAOx3  NEUROLOGY: non-focal  SKIN: No rashes/lesions appreciated GENERAL: NAD, elderly F looking younger than her age, speaks Tagalog but also English  HEENT:  Atraumatic, Normocephalic; EOMI, PERRLA, conjunctiva pink, sclera white, Supple, No JVD, very hard of hearing  CHEST/LUNG: Clear to auscultation bilaterally; No wheeze or crackles  HEART: Regular rate and rhythm; systolic murmur loudest L sternal border  ABDOMEN: Soft, Nontender, Nondistended; Bowel sounds present  EXTREMITIES:  2+ Peripheral Pulses, No peripheral pitting edema or asymmetric swelling/erythema, no calf tenderness  PSYCH: AAOx3 at time of my exam   NEUROLOGY: non-focal  SKIN: No rashes/lesions appreciated

## 2020-04-27 NOTE — ED ADULT NURSE NOTE - OBJECTIVE STATEMENT
[place] was [place] [facility] for altered mental status. Pt is alert on arrival, is hard on hearing, has a hearing aid in her left ear, denies pain, and requested to urinate. Pt understand basic English, Her Daughter was called twice on the phone to give detail report as the EMT don't know much about her. Pt is Covid Positive since March, but No fever, no cough, her oxygen is 100 3lit RA, no sign of distress and no SOB. Pt is calm, some confusion noted when talking to her, we don't know if is language barrier or hard to hearing or AML. He edwin feet is not edematous as the report we got stated.

## 2020-04-27 NOTE — ED ADULT TRIAGE NOTE - RESPIRATORY RATE (BREATHS/MIN)
Called to LDR to assist with first feeding for baby girl. Parents are undecided on name but believe to be between Arabella and Malissa.    Maternal Hx: ,  first child for 7 months, wisdom teeth removal and cyst removal, Depression    Maternal Meds: PNV, Iron, Zoloft    Mom does have double electric medela pump for residential use.    Initial breastfeeding packet reviewed with mother and father before initiation of feeding.    39/1 week gestation female infant delivered via repeat  at 0836 weighing 8 lb 14.9 oz (4050G). Infant is LGA. Initial blood sugar is 43 with repeat of 47. Mom can hand express bilaterally and admits to breast changes during pregnancy. Properly demonstrated to mother how to latch infant. Infant suckles at breast without stimulation and deep jaw drops are noted. Mom prefers cradle or cross cradle hold. Stayed for entire feeding to offer encouragement and support to mom. Instructed on proper feeding plan and feeding cues. Mom does have history of engorgement with her last child. Infant suckles well on bilateral breasts for 1515. All questions answered.    The following information was reviewed with om during feeding: Breastfeeding A Great Start Book by Katlyn Bustamante RN, LCCE, ICD and GJasvir Fang MD, FACOG    [facility] by [facility]    Freshly Expressed Breastmilk Storage Guidelines for Healthy Term Babies References: [email or web address]           20

## 2020-04-27 NOTE — H&P ADULT - ATTENDING COMMENTS
HPI as above.  Interval history: Pt seen and examined at bedside. No cp or sob. Pt is aaox3, hard of hearing. Does not know why her daughter brought her to the hospital.   Vital Signs (24 Hrs):  T(C): 36.4 (04-28-20 @ 07:49), Max: 36.8 (04-27-20 @ 23:50)  HR: 89 (04-28-20 @ 07:49) (78 - 91)  BP: 131/61 (04-28-20 @ 07:49) (125/78 - 191/82)  RR: 18 (04-28-20 @ 07:49) (18 - 20)  SpO2: 100% (04-28-20 @ 07:49) (100% - 100%)  Wt(kg): --  Daily     Daily     I&O's Summary    27 Apr 2020 07:01  -  28 Apr 2020 07:00  --------------------------------------------------------  IN: 600 mL / OUT: 1750 mL / NET: -1150 mL      PHYSICAL EXAM:  GENERAL: NAD, well-developed  HEAD:  Atraumatic, Normocephalic  EYES: EOMI, PERRLA, conjunctiva and sclera clear  NECK: Supple, No JVD  CHEST/LUNG: Clear to auscultation bilaterally; No wheeze  HEART: Regular rate and rhythm; No murmurs, rubs, or gallops  ABDOMEN: Soft, Nontender, Nondistended; Bowel sounds present  EXTREMITIES:  2+ Peripheral Pulses, No clubbing, cyanosis, or edema  PSYCH: AAOx3  NEUROLOGY: non-focal  SKIN: No rashes or lesions  Labs reviewed  Imaging reviewed: < from: Xray Chest 1 View AP/PA (04.27.20 @ 21:05) >    Impression:      Right basilar mild hazy opacity, unchanged.    < end of copied text >    HCT     EKG reviewed: < from: 12 Lead ECG (04.27.20 @ 20:12) >    Diagnosis Line Normal sinus rhythm  Normal ECG    < end of copied text >    Plan  #encephalopathy resolved  suspected PE- d-dimer elevated- follow up CTA, duplex neg   possible covid- fu swab  possible htn urgency- bp was 180- no resoled   PT consult    #macrocytica anemia- follow up w/u, MARLA neg on admission- follow up cbc.     #rest as above    #Progress Note Handoff  Pending (specify):  follow up above  Family discussion: unable to reach daughter, house staff spoke to her  Disposition: Home___/SNF___/Other________/Unknown at this time____x____

## 2020-04-27 NOTE — H&P ADULT - NSHPOUTPATIENTPROVIDERS_GEN_ALL_CORE
Rosa Maria (daughter): # on chart [phone] Rosa Maria (daughter): # from [facility] that ED got was [phone]

## 2020-04-27 NOTE — H&P ADULT - NSHPLABSRESULTS_GEN_ALL_CORE
< from: CT Head No Cont (04.27.20 @ 21:10) >    FINDINGS:  The ventricular, basal cisternal and sulcal patterns are appropriate for patient's stated age.  No evidence of acute intracranial hemorrhage, territorial infarct, or significant space-occupying lesion. Bilateral basal ganglial, cerebellar calcifications.  Gray-white differentiation is maintained.  Patchy hypoattenuation in the cerebral hemispheric white matter without mass effect is consistent with chronic microvascular ischemic changes.  No depressed calvarial fracture. The visualized portions of the paranasal sinuses are unremarkable.    IMPRESSION:  No CT evidence of acute intracranial pathology.  < end of copied text >

## 2020-04-27 NOTE — ED PROVIDER NOTE - ATTENDING CONTRIBUTION TO CARE
89F pmh DM HTN, COVID+ 4/2 s/p zpak and plaquenil, p/w AMS since last night. pt states she feels "unwell" and reports urinary frequency. seen at  [facility] and sent to ED. denies fever, cough, cp, sob, abd pain, nvdc. no dysuria, hematuria. no falls. no ha, weakness, numbness. also reports LE edema at bilat ankles.     on exam, AFVSS, well ciaran nad, ncat, eomi, perrla, mmm, lctab, rrr nl s1s2 no mrg, abd soft ntnd, aaox3, CN 2-12 intact, No nystagmus.  5/5 motor x 4 ext, SILT x 4 extremities, No facial droop or slurred speech. No pronator drift.  No midline C/T/L tenderness to palpation or step off, no le edema or calf ttp,     a/p; AMS, urinary freq, recent covid, will do labs, ekg/trop, cxr, ua, cth, admit for AMS 89F pmh DM HTN, COVID+ 4/2 s/p zpak and plaquenil, p/w AMS since last night. pt states she feels "unwell" and reports urinary frequency. seen at  [facility] and sent to ED. denies fever, cough, cp, sob, abd pain, nvdc. no dysuria, hematuria. no falls. no ha, weakness, numbness. also reports LE edema at bilat ankles. per EMS, daughter at urgent states pt was playing [employer] yesterday and saying things that didn't make sense about the game she knows very well, and wasn't following commands which is change from baseline.     on exam, AFVSS, well ciaran nad, ncat, eomi, perrla, mmm, lctab, rrr nl s1s2 no mrg, abd soft ntnd, aaox3, CN 2-12 intact, No nystagmus.  5/5 motor x 4 ext, SILT x 4 extremities, No facial droop or slurred speech. No pronator drift.  No midline C/T/L tenderness to palpation or step off, no le edema or calf ttp,     a/p; AMS, urinary freq, recent covid, will do labs, ekg/trop, cxr, ua, cth, admit for AMS

## 2020-04-27 NOTE — H&P ADULT - HISTORY OF PRESENT ILLNESS
89y/o F with PMH of b/l deaf, HTN, DM, GERD brought from [facility] for altered mental status. As per ED note daughter noted 'altered behavior' yesterday with possible LE swelling. Day of admit took her to [facility] and there found her not following commands and daughter unable to give more details so sent her to ED. Pt seen in ED 4/7 after passing out and at that time was known to be COVID+ve on 4/2 - at that time no abnormalities found and d/c home. As per chart, was on HCQ/azithro outpt already. Currently denies any complaints, AAOx2 (unknown baseline), denies pain anywhere but feels she has malaise. Otherwise not really able to give much info.     In ED: T97.9, HR 91, /82, RR 20, SpO2 100 % on ambient air. Labs significant for Hbg of 9.2 (prior 11.6) w/o signs of GIB, Ddimer 2576, Na 130, K 5.8 (hemolyzed), , . CXR unremarkable. CTH no acute pathology. UA neg.  *** Called daughter Rosa Maria on #, unable to reach her *** 89y/o F with PMH of b/l deaf, HTN, DM, GERD brought from [facility] MD for altered mental status. As per ED note daughter noted 'altered behavior' yesterday with possible LE swelling. Day of admit took her to [facility] and there found her not following commands and daughter unable to give more details so sent her to ED. Pt seen in ED 4/7 after passing out and at that time was known to be COVID+ve on 4/2 - at that time no abnormalities found and d/c home. As per chart, was on HCQ/azithro outpt already. Currently denies any complaints, AAOx2 (unknown baseline), denies pain anywhere but feels she has malaise. Otherwise not really able to give much info.     In ED: T97.9, HR 91, /82, RR 20, SpO2 100 % on ambient air. Labs significant for Hbg of 9.2 (prior 11.6) w/o signs of GIB, Ddimer 2576, Na 130, K 5.8 (hemolyzed), , . CXR unremarkable. CTH no acute pathology. UA neg.  *** Called daughter Rosa Maria on # above, unable to reach her ***

## 2020-04-27 NOTE — ED PROVIDER NOTE - PHYSICAL EXAMINATION
CONSTITUTIONAL: well-appearing, in NAD, pt following commands  SKIN: Warm dry, normal skin turgor  HEAD: NCAT  EYES: EOMI, PERRLA, no scleral icterus, conjunctiva pink  ENT: normal pharynx with no erythema or exudates  NECK: Supple; non tender. Full ROM.  CARD: RRR, no murmurs.  RESP: clear to ausculation b/l. No crackles or wheezing.  ABD: soft, non-tender, non-distended, no rebound or guarding. No CVA tenderness.  EXT: Full ROM, no pedal edema, no calf tenderness. Peripheral pulses intact and symmetric.  NEURO: normal motor. normal sensory. CN II-XII intact.  PSYCH: Cooperative, appropriate. A&O x2

## 2020-04-28 ENCOUNTER — TRANSCRIPTION ENCOUNTER (OUTPATIENT)
Age: 85
End: 2020-04-28

## 2020-04-28 VITALS
DIASTOLIC BLOOD PRESSURE: 67 MMHG | RESPIRATION RATE: 20 BRPM | SYSTOLIC BLOOD PRESSURE: 138 MMHG | TEMPERATURE: 98 F | OXYGEN SATURATION: 100 % | HEART RATE: 86 BPM

## 2020-04-28 PROBLEM — E11.9 TYPE 2 DIABETES MELLITUS WITHOUT COMPLICATIONS: Chronic | Status: ACTIVE | Noted: 2020-04-07

## 2020-04-28 PROBLEM — I10 ESSENTIAL (PRIMARY) HYPERTENSION: Chronic | Status: ACTIVE | Noted: 2020-04-07

## 2020-04-28 LAB
ALBUMIN SERPL ELPH-MCNC: 3.9 G/DL — SIGNIFICANT CHANGE UP (ref 3.5–5.2)
ALP SERPL-CCNC: 65 U/L — SIGNIFICANT CHANGE UP (ref 30–115)
ALT FLD-CCNC: 24 U/L — SIGNIFICANT CHANGE UP (ref 0–41)
ANION GAP SERPL CALC-SCNC: 12 MMOL/L — SIGNIFICANT CHANGE UP (ref 7–14)
AST SERPL-CCNC: 19 U/L — SIGNIFICANT CHANGE UP (ref 0–41)
BASOPHILS # BLD AUTO: 0.05 K/UL — SIGNIFICANT CHANGE UP (ref 0–0.2)
BASOPHILS NFR BLD AUTO: 1.1 % — HIGH (ref 0–1)
BILIRUB SERPL-MCNC: 0.7 MG/DL — SIGNIFICANT CHANGE UP (ref 0.2–1.2)
BLD GP AB SCN SERPL QL: SIGNIFICANT CHANGE UP
BUN SERPL-MCNC: 8 MG/DL — LOW (ref 10–20)
CALCIUM SERPL-MCNC: 9 MG/DL — SIGNIFICANT CHANGE UP (ref 8.5–10.1)
CHLORIDE SERPL-SCNC: 96 MMOL/L — LOW (ref 98–110)
CO2 SERPL-SCNC: 31 MMOL/L — SIGNIFICANT CHANGE UP (ref 17–32)
CREAT SERPL-MCNC: 0.9 MG/DL — SIGNIFICANT CHANGE UP (ref 0.7–1.5)
CRP SERPL-MCNC: 0.16 MG/DL — SIGNIFICANT CHANGE UP (ref 0–0.4)
EOSINOPHIL # BLD AUTO: 0.11 K/UL — SIGNIFICANT CHANGE UP (ref 0–0.7)
EOSINOPHIL NFR BLD AUTO: 2.5 % — SIGNIFICANT CHANGE UP (ref 0–8)
FERRITIN SERPL-MCNC: 1809 NG/ML — HIGH (ref 15–150)
FOLATE SERPL-MCNC: >20 NG/ML — SIGNIFICANT CHANGE UP
GLUCOSE SERPL-MCNC: 112 MG/DL — HIGH (ref 70–99)
HCT VFR BLD CALC: 28.3 % — LOW (ref 37–47)
HGB BLD-MCNC: 9.4 G/DL — LOW (ref 12–16)
IMM GRANULOCYTES NFR BLD AUTO: 0.7 % — HIGH (ref 0.1–0.3)
IRON SATN MFR SERPL: 47 % — SIGNIFICANT CHANGE UP (ref 15–50)
IRON SATN MFR SERPL: 99 UG/DL — SIGNIFICANT CHANGE UP (ref 35–150)
LYMPHOCYTES # BLD AUTO: 1.08 K/UL — LOW (ref 1.2–3.4)
LYMPHOCYTES # BLD AUTO: 24.2 % — SIGNIFICANT CHANGE UP (ref 20.5–51.1)
MAGNESIUM SERPL-MCNC: 2.1 MG/DL — SIGNIFICANT CHANGE UP (ref 1.8–2.4)
MCHC RBC-ENTMCNC: 33.2 G/DL — SIGNIFICANT CHANGE UP (ref 32–37)
MCHC RBC-ENTMCNC: 33.6 PG — HIGH (ref 27–31)
MCV RBC AUTO: 101.1 FL — HIGH (ref 81–99)
MONOCYTES # BLD AUTO: 0.48 K/UL — SIGNIFICANT CHANGE UP (ref 0.1–0.6)
MONOCYTES NFR BLD AUTO: 10.7 % — HIGH (ref 1.7–9.3)
NEUTROPHILS # BLD AUTO: 2.72 K/UL — SIGNIFICANT CHANGE UP (ref 1.4–6.5)
NEUTROPHILS NFR BLD AUTO: 60.8 % — SIGNIFICANT CHANGE UP (ref 42.2–75.2)
NRBC # BLD: 0 /100 WBCS — SIGNIFICANT CHANGE UP (ref 0–0)
OSMOLALITY SERPL: 290 MOSMOL/KG — SIGNIFICANT CHANGE UP (ref 280–301)
PLATELET # BLD AUTO: 255 K/UL — SIGNIFICANT CHANGE UP (ref 130–400)
POTASSIUM SERPL-MCNC: 4.4 MMOL/L — SIGNIFICANT CHANGE UP (ref 3.5–5)
POTASSIUM SERPL-SCNC: 4.4 MMOL/L — SIGNIFICANT CHANGE UP (ref 3.5–5)
PROCALCITONIN SERPL-MCNC: 0.05 NG/ML — SIGNIFICANT CHANGE UP (ref 0.02–0.1)
PROT SERPL-MCNC: 6.7 G/DL — SIGNIFICANT CHANGE UP (ref 6–8)
RBC # BLD: 2.8 M/UL — LOW (ref 4.2–5.4)
RBC # FLD: 13.5 % — SIGNIFICANT CHANGE UP (ref 11.5–14.5)
SARS-COV-2 RNA SPEC QL NAA+PROBE: DETECTED
SODIUM SERPL-SCNC: 139 MMOL/L — SIGNIFICANT CHANGE UP (ref 135–146)
TIBC SERPL-MCNC: 209 UG/DL — LOW (ref 220–430)
TRANSFERRIN SERPL-MCNC: 172 MG/DL — LOW (ref 200–360)
TSH SERPL-MCNC: 1.3 UIU/ML — SIGNIFICANT CHANGE UP (ref 0.27–4.2)
UIBC SERPL-MCNC: 110 UG/DL — SIGNIFICANT CHANGE UP (ref 110–370)
VIT B12 SERPL-MCNC: 389 PG/ML — SIGNIFICANT CHANGE UP (ref 232–1245)
WBC # BLD: 4.47 K/UL — LOW (ref 4.8–10.8)
WBC # FLD AUTO: 4.47 K/UL — LOW (ref 4.8–10.8)

## 2020-04-28 PROCEDURE — 71275 CT ANGIOGRAPHY CHEST: CPT | Mod: 26,CS

## 2020-04-28 PROCEDURE — 95819 EEG AWAKE AND ASLEEP: CPT | Mod: 26

## 2020-04-28 PROCEDURE — 93970 EXTREMITY STUDY: CPT | Mod: 26

## 2020-04-28 RX ORDER — FOLIC ACID 0.8 MG
1 TABLET ORAL
Qty: 0 | Refills: 0 | DISCHARGE
Start: 2020-04-28

## 2020-04-28 RX ORDER — LOSARTAN POTASSIUM 100 MG/1
25 TABLET, FILM COATED ORAL DAILY
Refills: 0 | Status: DISCONTINUED | OUTPATIENT
Start: 2020-04-28 | End: 2020-04-28

## 2020-04-28 RX ADMIN — SODIUM CHLORIDE 75 MILLILITER(S): 9 INJECTION, SOLUTION INTRAVENOUS at 08:22

## 2020-04-28 RX ADMIN — CHLORHEXIDINE GLUCONATE 1 APPLICATION(S): 213 SOLUTION TOPICAL at 06:28

## 2020-04-28 RX ADMIN — Medication 1 MILLIGRAM(S): at 12:05

## 2020-04-28 RX ADMIN — ENOXAPARIN SODIUM 40 MILLIGRAM(S): 100 INJECTION SUBCUTANEOUS at 12:05

## 2020-04-28 RX ADMIN — PANTOPRAZOLE SODIUM 40 MILLIGRAM(S): 20 TABLET, DELAYED RELEASE ORAL at 07:27

## 2020-04-28 RX ADMIN — Medication 30 MILLIGRAM(S): at 07:27

## 2020-04-28 NOTE — DISCHARGE NOTE PROVIDER - HOSPITAL COURSE
89y/o F with PMH of b/l deaf, HTN, DM, GERD brought from [facility] for altered mental status. As per ED note daughter noted 'altered behavior' yesterday with possible LE swelling. [place] [facility] [place] found her not following commands and daughter unable to give more details so sent her to ED. Pt seen in ED 4/7 after passing out and at that time was known to be COVID+ve on 4/2 - at that time no abnormalities found and d/c home. As per chart, was on HCQ/azithro outpt already. Currently denies any complaints, AAOx2 (unknown baseline), denies pain anywhere but feels she has malaise. Otherwise not really able to give much info.         In ED: T97.9, HR 91, /82, RR 20, SpO2 100 % on ambient air. Labs significant for Hbg of 9.2 (prior 11.6) w/o signs of GIB, Ddimer 2576, Na 130, K 5.8 (hemolyzed), , . CXR unremarkable. CTH no acute pathology. UA neg.        Pt was admitted to the hospital for metabolic encephalopathy. On assessment overnight her mental status was back to baseline. She was AAOX3 and denied any active complaints. Her lab work did not show significant abnormality other than the drop in Hb. She did have hx of melena per daughter about 2 wks ago. She was given protonix and her Hb remained stable. Her MARLA was negative for acute bleeding. Her d-dimers were elevated she was sent for CT angio to r/o pulmonary embolism. She also had duplex LE which was negative. She will be discharged back home. Her daughter is her HHA and she wants to take her home.

## 2020-04-28 NOTE — DISCHARGE NOTE PROVIDER - NSDCMRMEDTOKEN_GEN_ALL_CORE_FT
folic acid 1 mg oral tablet: 1 tab(s) orally once a day  losartan 25 mg oral tablet: 1 tab(s) orally once a day  metFORMIN 500 mg oral tablet:   omeprazole 20 mg oral delayed release capsule: 1 cap(s) orally once a day  simvastatin 40 mg oral tablet: 1 tab(s) orally once a day (at bedtime)

## 2020-04-28 NOTE — PROGRESS NOTE ADULT - SUBJECTIVE AND OBJECTIVE BOX
PHILIP CASTILLO 89y Female  MRN#: [id number]   CODE STATUS:     SUBJECTIVE  Patient is a 89y old Female who presents with a chief complaint of encephalopathy (2020 22:02)    Currently admitted to medicine with the primary diagnosis of Metabolic encephalopathy    Today is hospital day 1d, and this morning she is not having active complaints. She is AAOX3 and no further episodes of confusion overnight.       OBJECTIVE  PAST MEDICAL & SURGICAL HISTORY  [place] (hard of hearing)  Diabetes mellitus  Hypertension  No significant past surgical history    ALLERGIES:  Allergy Status Unknown    MEDICATIONS:  STANDING MEDICATIONS  chlorhexidine 4% Liquid 1 Application(s) Topical <User Schedule>  enoxaparin Injectable 40 milliGRAM(s) SubCutaneous daily  folic acid 1 milliGRAM(s) Oral daily  lactated ringers. 1000 milliLiter(s) IV Continuous <Continuous>  NIFEdipine XL 30 milliGRAM(s) Oral daily  pantoprazole    Tablet 40 milliGRAM(s) Oral before breakfast  simvastatin 40 milliGRAM(s) Oral at bedtime    PRN MEDICATIONS  acetaminophen   Tablet .. 650 milliGRAM(s) Oral every 6 hours PRN      VITAL SIGNS: Last 24 Hours  T(C): 36.4 (2020 07:49), Max: 36.8 (2020 23:50)  T(F): 97.6 (2020 07:49), Max: 98.2 (2020 23:50)  HR: 89 (2020 07:49) (78 - 91)  BP: 131/61 (2020 07:49) (125/78 - 191/82)  BP(mean): --  RR: 18 (2020 07:49) (18 - 20)  SpO2: 100% (2020 07:49) (100% - 100%)    LABS:                        9.4    4.47  )-----------( 255      ( 2020 06:19 )             28.3     04-28    139  |  96<L>  |  8<L>  ----------------------------<  112<H>  4.4   |  31  |  0.9    Ca    9.0      2020 06:19  Mg     2.1     04-28    TPro  6.7  /  Alb  3.9  /  TBili  0.7  /  DBili  x   /  AST  19  /  ALT  24  /  AlkPhos  65  04-28      Urinalysis Basic - ( 2020 21:24 )    Color: Light Yellow / Appearance: Clear / S.006 / pH: x  Gluc: x / Ketone: Negative  / Bili: Negative / Urobili: <2 mg/dL   Blood: x / Protein: Negative / Nitrite: Negative   Leuk Esterase: Negative / RBC: x / WBC x   Sq Epi: x / Non Sq Epi: x / Bacteria: x        Troponin T, Serum: <0.01 ng/mL (20 @ 20:40)      CARDIAC MARKERS ( 2020 20:40 )  x     / <0.01 ng/mL / x     / x     / x          RADIOLOGY:  < from: CT Head No Cont (20 @ 21:10) >    IMPRESSION:    No CT evidence of acute intracranial pathology.    < end of copied text >  < from: Xray Chest 1 View AP/PA (20 @ 21:05) >  Impression:      Right basilar mild hazy opacity, unchanged.    < end of copied text >      PHYSICAL EXAM:    GENERAL: NAD, AAOx3  HEENT:  Atraumatic, Normocephalic. EOMI, PERRLA, conjunctiva and sclera clear, No JVD  PULMONARY: Clear to auscultation bilaterally; No wheeze  CARDIOVASCULAR: Regular rate and rhythm; No murmurs, rubs, or gallops  GASTROINTESTINAL: Soft, Nontender, Nondistended; Bowel sounds present  MUSCULOSKELETAL:  2+ Peripheral Pulses, No clubbing, cyanosis, or edema  NEUROLOGY: non-focal  SKIN: No rashes or lesions

## 2020-04-28 NOTE — PROGRESS NOTE ADULT - ASSESSMENT
89y/o F with PMH HTN, DM with known COVID + 4/2 presents with encephalopathy.     # Acute onset change in mental status now resolved likely from hypertensive encephalopathy vs metabolic  - could be related to prior episodes of syncope?  - pt currently AAOx3 (knows in hospital, daughter brought her b/c she was a little confused)  - CTH neg  - Na 130 on admit, repeat 139, c/w gentle hydrate w/ LR @75  - f/u BCx, TSH, B12, folate. UA negative  - f/u EEG  - will get CT Angio and duplex LE as d-dimers are high and pt is known covid.     #Macrocytic anemia  - pt has hx of melena, increase protonix to 40mg BID  - Hbg 9.1, drop from 11 prior  - check FOBT, B12, folate  - start folic acid supplement for now     #COVID+ve Status, no hypoxia  - CXR stable, no change from 4/7 (f/u final read)  - f/u COVID19 repeat swab to find out current status.     #HTN, poorly controlled  - unknown med rec  - will resume losartan 25 as pt had pseudohyperkalemia - hemolyzed sample  - avoid CCB given LE edema      #HLD- c/w simva 40     #MISC  - DVT Ppx: lovenox qd  - GI ppx: PPI  - Diet: DASH, CC  - Activity: OOBTC, fall precautions  - Dispo: likely d/c home after CT Angio, LE duplex and PT eval.

## 2020-04-28 NOTE — DISCHARGE NOTE NURSING/CASE MANAGEMENT/SOCIAL WORK - PATIENT PORTAL LINK FT
You can access the FollowMyHealth Patient Portal offered by [facility] by registering at the following website: [email or web address]. By joining [facility]’s FollowMyHealth portal, you will also be able to view your health information using other applications (apps) compatible with our system.

## 2020-04-28 NOTE — DISCHARGE NOTE PROVIDER - NSDCCPCAREPLAN_GEN_ALL_CORE_FT
PRINCIPAL DISCHARGE DIAGNOSIS  Diagnosis: Metabolic encephalopathy  Assessment and Plan of Treatment: you were admitted to the hospital for change in mental status and confusion. Your blood pressure was high on admission which improved with medications. Your work up showed that your were a little dehydrated and you had drop in your hemoglobin. Continue taking omeprazol medication and monitor for dark or red colored stools. Take plenty of fluids. Continue taking medications as advised and follow up with your PMD.

## 2020-04-29 LAB — FERRITIN SERPL-MCNC: 1876 NG/ML — HIGH (ref 15–150)

## 2020-05-01 LAB
-  AMIKACIN: SIGNIFICANT CHANGE UP
-  AMPICILLIN/SULBACTAM: SIGNIFICANT CHANGE UP
-  AMPICILLIN: SIGNIFICANT CHANGE UP
-  AZTREONAM: SIGNIFICANT CHANGE UP
-  CEFAZOLIN: SIGNIFICANT CHANGE UP
-  CEFEPIME: SIGNIFICANT CHANGE UP
-  CEFOXITIN: SIGNIFICANT CHANGE UP
-  CEFTRIAXONE: SIGNIFICANT CHANGE UP
-  CIPROFLOXACIN: SIGNIFICANT CHANGE UP
-  ERTAPENEM: SIGNIFICANT CHANGE UP
-  GENTAMICIN: SIGNIFICANT CHANGE UP
-  IMIPENEM: SIGNIFICANT CHANGE UP
-  LEVOFLOXACIN: SIGNIFICANT CHANGE UP
-  MEROPENEM: SIGNIFICANT CHANGE UP
-  NITROFURANTOIN: SIGNIFICANT CHANGE UP
-  PIPERACILLIN/TAZOBACTAM: SIGNIFICANT CHANGE UP
-  TIGECYCLINE: SIGNIFICANT CHANGE UP
-  TOBRAMYCIN: SIGNIFICANT CHANGE UP
-  TRIMETHOPRIM/SULFAMETHOXAZOLE: SIGNIFICANT CHANGE UP
CULTURE RESULTS: SIGNIFICANT CHANGE UP
DRUG SCREEN, SERUM: SIGNIFICANT CHANGE UP
METHOD TYPE: SIGNIFICANT CHANGE UP
ORGANISM # SPEC MICROSCOPIC CNT: SIGNIFICANT CHANGE UP
ORGANISM # SPEC MICROSCOPIC CNT: SIGNIFICANT CHANGE UP
SPECIMEN SOURCE: SIGNIFICANT CHANGE UP

## 2020-05-03 LAB
CULTURE RESULTS: SIGNIFICANT CHANGE UP
SPECIMEN SOURCE: SIGNIFICANT CHANGE UP

## 2020-05-06 DIAGNOSIS — I10 ESSENTIAL (PRIMARY) HYPERTENSION: ICD-10-CM

## 2020-05-06 DIAGNOSIS — K21.9 GASTRO-ESOPHAGEAL REFLUX DISEASE WITHOUT ESOPHAGITIS: ICD-10-CM

## 2020-05-06 DIAGNOSIS — U07.1 COVID-19: ICD-10-CM

## 2020-05-06 DIAGNOSIS — G93.41 METABOLIC ENCEPHALOPATHY: ICD-10-CM

## 2020-05-06 DIAGNOSIS — E86.0 DEHYDRATION: ICD-10-CM

## 2020-05-06 DIAGNOSIS — E78.5 HYPERLIPIDEMIA, UNSPECIFIED: ICD-10-CM

## 2020-05-06 DIAGNOSIS — E11.9 TYPE 2 DIABETES MELLITUS WITHOUT COMPLICATIONS: ICD-10-CM

## 2020-05-06 DIAGNOSIS — K64.4 RESIDUAL HEMORRHOIDAL SKIN TAGS: ICD-10-CM

## 2020-05-06 DIAGNOSIS — E87.1 HYPO-OSMOLALITY AND HYPONATREMIA: ICD-10-CM

## 2020-05-06 DIAGNOSIS — D53.9 NUTRITIONAL ANEMIA, UNSPECIFIED: ICD-10-CM

## 2020-05-06 DIAGNOSIS — H91.93 UNSPECIFIED HEARING LOSS, BILATERAL: ICD-10-CM

## 2020-07-31 ENCOUNTER — INPATIENT (INPATIENT)
Facility: HOSPITAL | Age: 85
LOS: 3 days | Discharge: NURSING, REHAB OR LONG-TERM CARE | End: 2020-08-04
Attending: SURGERY | Admitting: SURGERY
Payer: MEDICARE

## 2020-07-31 VITALS
OXYGEN SATURATION: 100 % | HEART RATE: 89 BPM | RESPIRATION RATE: 17 BRPM | DIASTOLIC BLOOD PRESSURE: 88 MMHG | SYSTOLIC BLOOD PRESSURE: 198 MMHG | TEMPERATURE: 99 F

## 2020-07-31 LAB
ALBUMIN SERPL ELPH-MCNC: 4.6 G/DL — SIGNIFICANT CHANGE UP (ref 3.5–5.2)
ALP SERPL-CCNC: 73 U/L — SIGNIFICANT CHANGE UP (ref 30–115)
ALT FLD-CCNC: 26 U/L — SIGNIFICANT CHANGE UP (ref 0–41)
ANION GAP SERPL CALC-SCNC: 14 MMOL/L — SIGNIFICANT CHANGE UP (ref 7–14)
APPEARANCE UR: CLEAR — SIGNIFICANT CHANGE UP
APTT BLD: 30.5 SEC — SIGNIFICANT CHANGE UP (ref 27–39.2)
AST SERPL-CCNC: 27 U/L — SIGNIFICANT CHANGE UP (ref 0–41)
BASOPHILS # BLD AUTO: 0.05 K/UL — SIGNIFICANT CHANGE UP (ref 0–0.2)
BASOPHILS NFR BLD AUTO: 0.4 % — SIGNIFICANT CHANGE UP (ref 0–1)
BILIRUB SERPL-MCNC: 0.3 MG/DL — SIGNIFICANT CHANGE UP (ref 0.2–1.2)
BILIRUB UR-MCNC: NEGATIVE — SIGNIFICANT CHANGE UP
BLD GP AB SCN SERPL QL: SIGNIFICANT CHANGE UP
BUN SERPL-MCNC: 19 MG/DL — SIGNIFICANT CHANGE UP (ref 10–20)
CALCIUM SERPL-MCNC: 10 MG/DL — SIGNIFICANT CHANGE UP (ref 8.5–10.1)
CHLORIDE SERPL-SCNC: 95 MMOL/L — LOW (ref 98–110)
CO2 SERPL-SCNC: 26 MMOL/L — SIGNIFICANT CHANGE UP (ref 17–32)
COLOR SPEC: COLORLESS — SIGNIFICANT CHANGE UP
CREAT SERPL-MCNC: 1.5 MG/DL — SIGNIFICANT CHANGE UP (ref 0.7–1.5)
DIFF PNL FLD: NEGATIVE — SIGNIFICANT CHANGE UP
EOSINOPHIL # BLD AUTO: 0.26 K/UL — SIGNIFICANT CHANGE UP (ref 0–0.7)
EOSINOPHIL NFR BLD AUTO: 2.2 % — SIGNIFICANT CHANGE UP (ref 0–8)
ETHANOL SERPL-MCNC: <10 MG/DL — SIGNIFICANT CHANGE UP
GLUCOSE SERPL-MCNC: 149 MG/DL — HIGH (ref 70–99)
GLUCOSE UR QL: NEGATIVE — SIGNIFICANT CHANGE UP
HCT VFR BLD CALC: 33.8 % — LOW (ref 37–47)
HGB BLD-MCNC: 11.6 G/DL — LOW (ref 12–16)
IMM GRANULOCYTES NFR BLD AUTO: 2.2 % — HIGH (ref 0.1–0.3)
INR BLD: 1.01 RATIO — SIGNIFICANT CHANGE UP (ref 0.65–1.3)
KETONES UR-MCNC: NEGATIVE — SIGNIFICANT CHANGE UP
LACTATE SERPL-SCNC: 1.5 MMOL/L — SIGNIFICANT CHANGE UP (ref 0.7–2)
LEUKOCYTE ESTERASE UR-ACNC: NEGATIVE — SIGNIFICANT CHANGE UP
LIDOCAIN IGE QN: 28 U/L — SIGNIFICANT CHANGE UP (ref 7–60)
LYMPHOCYTES # BLD AUTO: 19.2 % — LOW (ref 20.5–51.1)
LYMPHOCYTES # BLD AUTO: 2.32 K/UL — SIGNIFICANT CHANGE UP (ref 1.2–3.4)
MCHC RBC-ENTMCNC: 34.3 G/DL — SIGNIFICANT CHANGE UP (ref 32–37)
MCHC RBC-ENTMCNC: 34.7 PG — HIGH (ref 27–31)
MCV RBC AUTO: 101.2 FL — HIGH (ref 81–99)
MONOCYTES # BLD AUTO: 0.92 K/UL — HIGH (ref 0.1–0.6)
MONOCYTES NFR BLD AUTO: 7.6 % — SIGNIFICANT CHANGE UP (ref 1.7–9.3)
NEUTROPHILS # BLD AUTO: 8.28 K/UL — HIGH (ref 1.4–6.5)
NEUTROPHILS NFR BLD AUTO: 68.4 % — SIGNIFICANT CHANGE UP (ref 42.2–75.2)
NITRITE UR-MCNC: NEGATIVE — SIGNIFICANT CHANGE UP
NRBC # BLD: 0 /100 WBCS — SIGNIFICANT CHANGE UP (ref 0–0)
PH UR: 8 — SIGNIFICANT CHANGE UP (ref 5–8)
PLATELET # BLD AUTO: 196 K/UL — SIGNIFICANT CHANGE UP (ref 130–400)
POTASSIUM SERPL-MCNC: 4.4 MMOL/L — SIGNIFICANT CHANGE UP (ref 3.5–5)
POTASSIUM SERPL-SCNC: 4.4 MMOL/L — SIGNIFICANT CHANGE UP (ref 3.5–5)
PROT SERPL-MCNC: 7.2 G/DL — SIGNIFICANT CHANGE UP (ref 6–8)
PROT UR-MCNC: SIGNIFICANT CHANGE UP
PROTHROM AB SERPL-ACNC: 11.6 SEC — SIGNIFICANT CHANGE UP (ref 9.95–12.87)
RBC # BLD: 3.34 M/UL — LOW (ref 4.2–5.4)
RBC # FLD: 12.3 % — SIGNIFICANT CHANGE UP (ref 11.5–14.5)
SODIUM SERPL-SCNC: 135 MMOL/L — SIGNIFICANT CHANGE UP (ref 135–146)
SP GR SPEC: 1.03 — HIGH (ref 1.01–1.02)
UROBILINOGEN FLD QL: SIGNIFICANT CHANGE UP
WBC # BLD: 12.09 K/UL — HIGH (ref 4.8–10.8)
WBC # FLD AUTO: 12.09 K/UL — HIGH (ref 4.8–10.8)

## 2020-07-31 PROCEDURE — 70486 CT MAXILLOFACIAL W/O DYE: CPT | Mod: 26

## 2020-07-31 PROCEDURE — 74177 CT ABD & PELVIS W/CONTRAST: CPT | Mod: 26

## 2020-07-31 PROCEDURE — 71260 CT THORAX DX C+: CPT | Mod: 26

## 2020-07-31 PROCEDURE — 99223 1ST HOSP IP/OBS HIGH 75: CPT

## 2020-07-31 PROCEDURE — 72170 X-RAY EXAM OF PELVIS: CPT | Mod: 26

## 2020-07-31 PROCEDURE — 72125 CT NECK SPINE W/O DYE: CPT | Mod: 26

## 2020-07-31 PROCEDURE — 71045 X-RAY EXAM CHEST 1 VIEW: CPT | Mod: 26

## 2020-07-31 PROCEDURE — 70450 CT HEAD/BRAIN W/O DYE: CPT | Mod: 26

## 2020-07-31 PROCEDURE — 99285 EMERGENCY DEPT VISIT HI MDM: CPT | Mod: CS,GC

## 2020-07-31 RX ORDER — DEXTROSE 50 % IN WATER 50 %
25 SYRINGE (ML) INTRAVENOUS ONCE
Refills: 0 | Status: DISCONTINUED | OUTPATIENT
Start: 2020-07-31 | End: 2020-08-04

## 2020-07-31 RX ORDER — LOSARTAN POTASSIUM 100 MG/1
25 TABLET, FILM COATED ORAL DAILY
Refills: 0 | Status: DISCONTINUED | OUTPATIENT
Start: 2020-07-31 | End: 2020-08-04

## 2020-07-31 RX ORDER — DEXTROSE 50 % IN WATER 50 %
12.5 SYRINGE (ML) INTRAVENOUS ONCE
Refills: 0 | Status: DISCONTINUED | OUTPATIENT
Start: 2020-07-31 | End: 2020-08-04

## 2020-07-31 RX ORDER — OXYCODONE HYDROCHLORIDE 5 MG/1
5 TABLET ORAL EVERY 6 HOURS
Refills: 0 | Status: DISCONTINUED | OUTPATIENT
Start: 2020-07-31 | End: 2020-08-04

## 2020-07-31 RX ORDER — DEXTROSE 50 % IN WATER 50 %
15 SYRINGE (ML) INTRAVENOUS ONCE
Refills: 0 | Status: DISCONTINUED | OUTPATIENT
Start: 2020-07-31 | End: 2020-08-04

## 2020-07-31 RX ORDER — ACETAMINOPHEN 500 MG
975 TABLET ORAL ONCE
Refills: 0 | Status: COMPLETED | OUTPATIENT
Start: 2020-07-31 | End: 2020-07-31

## 2020-07-31 RX ORDER — FOLIC ACID 0.8 MG
1 TABLET ORAL DAILY
Refills: 0 | Status: DISCONTINUED | OUTPATIENT
Start: 2020-07-31 | End: 2020-08-04

## 2020-07-31 RX ORDER — SIMVASTATIN 20 MG/1
20 TABLET, FILM COATED ORAL AT BEDTIME
Refills: 0 | Status: DISCONTINUED | OUTPATIENT
Start: 2020-07-31 | End: 2020-08-04

## 2020-07-31 RX ORDER — PANTOPRAZOLE SODIUM 20 MG/1
40 TABLET, DELAYED RELEASE ORAL
Refills: 0 | Status: DISCONTINUED | OUTPATIENT
Start: 2020-07-31 | End: 2020-08-04

## 2020-07-31 RX ORDER — ACETAMINOPHEN 500 MG
650 TABLET ORAL EVERY 6 HOURS
Refills: 0 | Status: DISCONTINUED | OUTPATIENT
Start: 2020-07-31 | End: 2020-08-04

## 2020-07-31 RX ORDER — IBUPROFEN 200 MG
400 TABLET ORAL EVERY 6 HOURS
Refills: 0 | Status: DISCONTINUED | OUTPATIENT
Start: 2020-07-31 | End: 2020-08-04

## 2020-07-31 RX ORDER — SODIUM CHLORIDE 9 MG/ML
1000 INJECTION, SOLUTION INTRAVENOUS
Refills: 0 | Status: DISCONTINUED | OUTPATIENT
Start: 2020-07-31 | End: 2020-08-04

## 2020-07-31 RX ORDER — HEPARIN SODIUM 5000 [USP'U]/ML
5000 INJECTION INTRAVENOUS; SUBCUTANEOUS EVERY 8 HOURS
Refills: 0 | Status: DISCONTINUED | OUTPATIENT
Start: 2020-07-31 | End: 2020-08-04

## 2020-07-31 RX ORDER — INSULIN LISPRO 100/ML
VIAL (ML) SUBCUTANEOUS
Refills: 0 | Status: DISCONTINUED | OUTPATIENT
Start: 2020-07-31 | End: 2020-08-04

## 2020-07-31 RX ORDER — SODIUM CHLORIDE 9 MG/ML
1000 INJECTION INTRAMUSCULAR; INTRAVENOUS; SUBCUTANEOUS
Refills: 0 | Status: DISCONTINUED | OUTPATIENT
Start: 2020-07-31 | End: 2020-08-01

## 2020-07-31 RX ORDER — SIMVASTATIN 20 MG/1
1 TABLET, FILM COATED ORAL
Qty: 0 | Refills: 0 | DISCHARGE

## 2020-07-31 RX ORDER — GLUCAGON INJECTION, SOLUTION 0.5 MG/.1ML
1 INJECTION, SOLUTION SUBCUTANEOUS ONCE
Refills: 0 | Status: DISCONTINUED | OUTPATIENT
Start: 2020-07-31 | End: 2020-08-04

## 2020-07-31 RX ADMIN — Medication 975 MILLIGRAM(S): at 22:22

## 2020-07-31 NOTE — ED PROVIDER NOTE - PROGRESS NOTE DETAILS
AG: received signout from Dr Paulson at 1900, pt seen at bedside resting comfortably no new complaints, CT results reviewed trauma consult placed. AG: trauma requests ortho consult

## 2020-07-31 NOTE — H&P ADULT - NSICDXPASTMEDICALHX_GEN_ALL_CORE_FT
PAST MEDICAL HISTORY:  Diabetes mellitus     [place] (hard of hearing)     Hypertension     TIA (transient ischemic attack) Over ten years ago; was on plavix now DC

## 2020-07-31 NOTE — H&P ADULT - HISTORY OF PRESENT ILLNESS
PHILIP CASTILLO [id number]    HPI:  89y Female with PMH of hard of hearing, Diabetes mellitus, Hypertension, TIAx1, came to the ED s/p Fall. Pt was walking down the stairs and after reaching the bottom step she lost her balance. Pt fell on her left side and complains of head, lower chest, wrist and leg pain. +HT, -LOC, -AC, GCS 15. Pt was not able to get up but was helped up to the chair when help arrived and then brought to the ED. Pt denies chest pain, sob, abd pain, nausea, vomiting, dizziness. Pt is unable to ambulate and ROM is restricted due to pain.

## 2020-07-31 NOTE — ED PROVIDER NOTE - OBJECTIVE STATEMENT
89yF with pmh dm, hld, p/w mechanical fall forward from standing with +HT just prior to arrival. pt c/o L head, shoulder, and hip pain where she fell. predominantly American speaking, with HCP acting as . 89yF with pmh dm, hld, p/w mechanical fall forward from standing with +HT just prior to arrival. pt c/o L head, shoulder, and hip pain where she fell. predominantly [place] speaking, with HCP (daughter)- nurse- acting as .

## 2020-07-31 NOTE — H&P ADULT - ATTENDING COMMENTS
I examined the patient and discussed my plan with the resident  the patient has a pubic ramus fx with pain and mobility limitations.  ortho non-op.  she will need pain control, and rehab

## 2020-07-31 NOTE — ED PROVIDER NOTE - NS ED ROS FT
Eyes:  No visual changes, eye pain or discharge.  ENMT:  No hearing changes, pain, no sore throat or runny nose, no difficulty swallowing  Cardiac:  No chest pain, SOB or edema. No chest pain with exertion.  Respiratory:  No cough or respiratory distress.    GI:  No nausea, vomiting, diarrhea or abdominal pain.  :  No dysuria, frequency or burning.  MS:  +fall from standing  Neuro:  No headache or weakness.  No LOC.  Skin:  No skin rash.   Endocrine:+ diabetes.

## 2020-07-31 NOTE — ED ADULT NURSE NOTE - OBJECTIVE STATEMENT
patient states she was standing, tripped and fell onto left hip. c/o left hip pain and left shoulder pain. denies head trauma, denies loc

## 2020-07-31 NOTE — H&P ADULT - NSHPPHYSICALEXAM_GEN_ALL_CORE
Vital Signs Last 24 Hrs  T(C): 37.1 (31 Jul 2020 18:02), Max: 37.1 (31 Jul 2020 18:02)  T(F): 98.7 (31 Jul 2020 18:02), Max: 98.7 (31 Jul 2020 18:02)  HR: 89 (31 Jul 2020 18:02) (89 - 89)  BP: 198/88 (31 Jul 2020 18:02) (198/88 - 198/88)  BP(mean): --  RR: 17 (31 Jul 2020 18:02) (17 - 17)  SpO2: 100% (31 Jul 2020 18:02) (100% - 100%)    PHYSICAL EXAM:  HEENT: Small cut anterior to left ear  GENERAL: NAD, well-appearing  CHEST/LUNG: Tenderness elicited on left lateral lower chest. Clear to auscultation bilaterally  HEART: Regular rate and rhythm  ABDOMEN: Soft, Nontender, Nondistended  EXTREMITIES: Pain in the left hip area, decreased ROM, sensations intact. No clubbing, cyanosis, or edema.  Left wrist: mild tenderness present, normal strength, sensations present

## 2020-07-31 NOTE — ED PROVIDER NOTE - PHYSICAL EXAMINATION
Vital Signs: I have reviewed the initial vital signs.  Constitutional: NAD, well-nourished, appears stated age, no acute distress.  HEENT: Airway patent, moist MM, no erythema/swelling/deformity of oral structures. EOMI, PERRLA.  CV: regular rate, regular rhythm, well-perfused extremities, 2+ b/l DP and radial pulses equal.  Lungs: BCTA, no increased WOB.  ABD: NTND, no guarding or rebound, no pulsatile mass, no hernias.   MSK: Neck supple, nontender, nl ROM, no stepoff. Chest nontender. Back nontender in TLS spine or to b/l bony structures or flanks. L hip tender with rom. Ext otherwise nontender, nl rom, no deformity.   INTEG: Skin warm, dry, no rash.  NEURO: A&Ox3, moving all extremities, normal speech  PSYCH: Calm, cooperative, normal affect and interaction. Vital Signs: I have reviewed the initial vital signs.  Constitutional: NAD, well-nourished, appears stated age, no acute distress.  HEENT: Airway patent, moist MM, no erythema/swelling/deformity of oral structures. EOMI, PERRLA.  CV: regular rate, regular rhythm, well-perfused extremities, 2+ b/l DP and radial pulses equal.  Lungs: BCTA, no increased WOB.  ABD: NTND, no guarding or rebound, no pulsatile mass, no hernias.   MSK: Neck supple, nontender, nl ROM, no stepoff. Chest nontender. Back nontender in TLS spine or to b/l bony structures or flanks. L hip tender with rom. Ext otherwise nontender, nl rom, no deformity.   INTEG: Skin warm, dry, no rash. hematoma just anterior to L ear.  NEURO: A&Ox3, moving all extremities, normal speech  PSYCH: Calm, cooperative, normal affect and interaction.

## 2020-07-31 NOTE — H&P ADULT - ASSESSMENT
ASSESSMENT:  89y Female with PMH of hard of hearing, Diabetes mellitus, Hypertension, TIAx1, came to the ED s/p Fall with rad evidence edema overlying left mandibular, deformity of pubic symphysis and fracture of left inferior pubic ramus of old trauma/ degenerative changes.     PLAN:  -Admit to surgery  -PT/Rehab  -Xray left hand      D/w Dr. Jerry ASSESSMENT:  89y Female with PMH of hard of hearing, Diabetes mellitus, Hypertension, TIAx1, came to the ED s/p Fall with rad evidence edema overlying left mandibular, deformity of pubic symphysis and fracture of left inferior pubic ramus of old trauma/ degenerative changes.     PLAN:  -Admit to surgery  -PT/Rehab  -Xray left hand      Senior Trauma Resident Note  89F s/p fall onto left side with age indeterminate vs. acute left inf pubic rami and pubic symphysis fracture with new onset difficulty walking and pain with passive movement of left lower extremity. Patient will require admission for PT rehab evaluation and pain control.   Airway intact  Bilateral Breath Sounds  Palpable pulses in 4 ext  GCS 15, PERRL, GARDNER  VSS  No Subq emphysema, abdominal tenderness,  or pelvic instability   CXR and PXR negative  Ct findings above  Admit to trauma service  Plan as above d/w Dr. Jerry

## 2020-07-31 NOTE — ED ADULT NURSE REASSESSMENT NOTE - NS ED NURSE REASSESS COMMENT FT1
report received from previous shift RN. pt currently resting comfortably in bed connected to cardiac and pulse-ox monitoring. pt AOx4. pt denies pain or discomfort. pt recently returned from CT scan. pt awaiting ct scan results. Family currently at bedside. Will continue to monitor.

## 2020-07-31 NOTE — ED ADULT TRIAGE NOTE - CHIEF COMPLAINT QUOTE
pt s.p fall at home. pt c,.o left hip and head pain. pt denies anticoagulation or LOC. pt unable to ambulate at this time

## 2020-07-31 NOTE — ED PROVIDER NOTE - CLINICAL SUMMARY MEDICAL DECISION MAKING FREE TEXT BOX
Attending Note:   89 yo F DM, HTN, hyperlipidemia presents s/p fall PTA. Reports she hit her head and is unable to move due to L hip pain. Physical-nad,perrl,mmm,rrr,ctab,abd softntnd,fromx4, (+) L hip pain limited ROM, anox3. Pt is [place] speaking and resident at bedside is able to translate. Pt A&Ox3. Attending Note:   89 yo F DM, HTN, hyperlipidemia presents s/p fall PTA. Reports she hit her head and is unable to move due to L hip pain. Physical-nad,perrl,mmm,rrr,ctab,abd softntnd,fromx4, (+) L hip pain limited ROM, (+) left mandibular swelling, not TM bilat, anox3. Pt is [place] speaking and resident at bedside is able to translate. Pt A&Ox3. ED Pelvis portable XR negative for fx's or dislocations. ED CXR reviewed by me which did not reveal a ptx, infiltrate, or effusion. CT abd pelvis revealed possible left inf pubic rami fx. lab reviewed noted to be wnl. Pt admitted to trauma for rehab.

## 2020-07-31 NOTE — ED ADULT NURSE NOTE - NSIMPLEMENTINTERV_GEN_ALL_ED
Implemented All Fall with Harm Risk Interventions:  Ong to call system. Call bell, personal items and telephone within reach. Instruct patient to call for assistance. Room bathroom lighting operational. Non-slip footwear when patient is off stretcher. Physically safe environment: no spills, clutter or unnecessary equipment. Stretcher in lowest position, wheels locked, appropriate side rails in place. Provide visual cue, wrist band, yellow gown, etc. Monitor gait and stability. Monitor for mental status changes and reorient to person, place, and time. Review medications for side effects contributing to fall risk. Reinforce activity limits and safety measures with patient and family. Provide visual clues: red socks.

## 2020-07-31 NOTE — H&P ADULT - NSHPLABSRESULTS_GEN_ALL_CORE
LABS:  Labs:  CAPILLARY BLOOD GLUCOSE    POCT Blood Glucose.: 156 mg/dL (2020 18:19)                11.6   12.09 )-----------( 196      ( 2020 18:00 )             33.8       Auto Neutrophil %: 68.4 % (20 @ 18:00)  Auto Immature Granulocyte %: 2.2 % (20 @ 18:00)        135  |  95<L>  |  19  ----------------------------<  149<H>  4.4   |  26  |  1.5    Calcium, Total Serum: 10.0 mg/dL (20 @ 18:00)      LFTs:             7.2  | 0.3  | 27       ------------------[73      ( 2020 18:00 )  4.6  | x    | 26          Lipase:28     Amylase:x         Lactate, Blood: 1.5 mmol/L (20 @ 18:00)    Coags:     11.60  ----< 1.01    ( 2020 18:00 )     30.5      Alcohol, Blood: <10 mg/dL (20 @ 18:00)    Urinalysis Basic - ( 2020 21:45 )    Color: Colorless / Appearance: Clear / S.026 / pH: x  Gluc: x / Ketone: Negative  / Bili: Negative / Urobili: <2 mg/dL   Blood: x / Protein: Trace / Nitrite: Negative   Leuk Esterase: Negative / RBC: x / WBC x   Sq Epi: x / Non Sq Epi: x / Bacteria: x    RADIOLOGY & ADDITIONAL STUDIES:  < from: CT Chest w/ IV Cont (20 @ 19:32) >    IMPRESSION:    No evidence of intra-thoracic, abdominal or pelvic visceral laceration or hematoma.    There is deformity of the pubic symphysis which may related to old trauma and/or degenerative change. There is also a fracture of the left inferior pubic ramus, of indeterminate age, which may be acute.    < end of copied text >    < from: CT Cervical Spine No Cont (20 @ 19:33) >    IMPRESSION:    No CT evidence of acute fracture or significant subluxation of the cervical spine.    Trace anterolisthesis of C2 on C3.    Multilevel degenerative changes.    < end of copied text >    < from: CT Abdomen and Pelvis w/ IV Cont (20 @ 19:33) >    IMPRESSION:    No evidence of intra-thoracic, abdominal or pelvic visceral laceration or hematoma.    There is deformity of the pubic symphysis which may related to old trauma and/or degenerative change. There is also a fracture of the left inferior pubic ramus, of indeterminate age, which may be acute.    < end of copied text >    < from: CT Maxillofacial No Cont (20 @ 20:24) >      IMPRESSION:    Focal soft tissue edema overlying the left mandibular ramus without encapsulated hematoma, drainable fluid collection, or underlying bony fracture/dislocation.    < end of copied text >

## 2020-08-01 PROBLEM — H91.90 UNSPECIFIED HEARING LOSS, UNSPECIFIED EAR: Chronic | Status: ACTIVE | Noted: 2020-04-27

## 2020-08-01 LAB
A1C WITH ESTIMATED AVERAGE GLUCOSE RESULT: 7.2 % — HIGH (ref 4–5.6)
ANION GAP SERPL CALC-SCNC: 10 MMOL/L — SIGNIFICANT CHANGE UP (ref 7–14)
BASOPHILS # BLD AUTO: 0.06 K/UL — SIGNIFICANT CHANGE UP (ref 0–0.2)
BASOPHILS NFR BLD AUTO: 0.7 % — SIGNIFICANT CHANGE UP (ref 0–1)
BUN SERPL-MCNC: 14 MG/DL — SIGNIFICANT CHANGE UP (ref 10–20)
CALCIUM SERPL-MCNC: 8.5 MG/DL — SIGNIFICANT CHANGE UP (ref 8.5–10.1)
CHLORIDE SERPL-SCNC: 101 MMOL/L — SIGNIFICANT CHANGE UP (ref 98–110)
CO2 SERPL-SCNC: 24 MMOL/L — SIGNIFICANT CHANGE UP (ref 17–32)
CREAT SERPL-MCNC: 1 MG/DL — SIGNIFICANT CHANGE UP (ref 0.7–1.5)
EOSINOPHIL # BLD AUTO: 0.22 K/UL — SIGNIFICANT CHANGE UP (ref 0–0.7)
EOSINOPHIL NFR BLD AUTO: 2.6 % — SIGNIFICANT CHANGE UP (ref 0–8)
ESTIMATED AVERAGE GLUCOSE: 160 MG/DL — HIGH (ref 68–114)
GLUCOSE BLDC GLUCOMTR-MCNC: 126 MG/DL — HIGH (ref 70–99)
GLUCOSE BLDC GLUCOMTR-MCNC: 145 MG/DL — HIGH (ref 70–99)
GLUCOSE BLDC GLUCOMTR-MCNC: 184 MG/DL — HIGH (ref 70–99)
GLUCOSE BLDC GLUCOMTR-MCNC: 253 MG/DL — HIGH (ref 70–99)
GLUCOSE SERPL-MCNC: 127 MG/DL — HIGH (ref 70–99)
HCT VFR BLD CALC: 30 % — LOW (ref 37–47)
HGB BLD-MCNC: 10.3 G/DL — LOW (ref 12–16)
IMM GRANULOCYTES NFR BLD AUTO: 1 % — HIGH (ref 0.1–0.3)
LYMPHOCYTES # BLD AUTO: 1.31 K/UL — SIGNIFICANT CHANGE UP (ref 1.2–3.4)
LYMPHOCYTES # BLD AUTO: 15.6 % — LOW (ref 20.5–51.1)
MAGNESIUM SERPL-MCNC: 2 MG/DL — SIGNIFICANT CHANGE UP (ref 1.8–2.4)
MCHC RBC-ENTMCNC: 34.3 G/DL — SIGNIFICANT CHANGE UP (ref 32–37)
MCHC RBC-ENTMCNC: 34.3 PG — HIGH (ref 27–31)
MCV RBC AUTO: 100 FL — HIGH (ref 81–99)
MONOCYTES # BLD AUTO: 0.82 K/UL — HIGH (ref 0.1–0.6)
MONOCYTES NFR BLD AUTO: 9.8 % — HIGH (ref 1.7–9.3)
NEUTROPHILS # BLD AUTO: 5.91 K/UL — SIGNIFICANT CHANGE UP (ref 1.4–6.5)
NEUTROPHILS NFR BLD AUTO: 70.3 % — SIGNIFICANT CHANGE UP (ref 42.2–75.2)
NRBC # BLD: 0 /100 WBCS — SIGNIFICANT CHANGE UP (ref 0–0)
PHOSPHATE SERPL-MCNC: 2.9 MG/DL — SIGNIFICANT CHANGE UP (ref 2.1–4.9)
PLATELET # BLD AUTO: 153 K/UL — SIGNIFICANT CHANGE UP (ref 130–400)
POTASSIUM SERPL-MCNC: 3.4 MMOL/L — LOW (ref 3.5–5)
POTASSIUM SERPL-SCNC: 3.4 MMOL/L — LOW (ref 3.5–5)
RBC # BLD: 3 M/UL — LOW (ref 4.2–5.4)
RBC # FLD: 12.5 % — SIGNIFICANT CHANGE UP (ref 11.5–14.5)
SARS-COV-2 RNA SPEC QL NAA+PROBE: SIGNIFICANT CHANGE UP
SODIUM SERPL-SCNC: 135 MMOL/L — SIGNIFICANT CHANGE UP (ref 135–146)
WBC # BLD: 8.4 K/UL — SIGNIFICANT CHANGE UP (ref 4.8–10.8)
WBC # FLD AUTO: 8.4 K/UL — SIGNIFICANT CHANGE UP (ref 4.8–10.8)

## 2020-08-01 PROCEDURE — 99232 SBSQ HOSP IP/OBS MODERATE 35: CPT

## 2020-08-01 PROCEDURE — 73100 X-RAY EXAM OF WRIST: CPT | Mod: 26,LT

## 2020-08-01 PROCEDURE — 93010 ELECTROCARDIOGRAM REPORT: CPT

## 2020-08-01 RX ORDER — SODIUM CHLORIDE 9 MG/ML
1000 INJECTION INTRAMUSCULAR; INTRAVENOUS; SUBCUTANEOUS
Refills: 0 | Status: DISCONTINUED | OUTPATIENT
Start: 2020-08-01 | End: 2020-08-02

## 2020-08-01 RX ORDER — POTASSIUM PHOSPHATE, MONOBASIC POTASSIUM PHOSPHATE, DIBASIC 236; 224 MG/ML; MG/ML
30 INJECTION, SOLUTION INTRAVENOUS ONCE
Refills: 0 | Status: COMPLETED | OUTPATIENT
Start: 2020-08-01 | End: 2020-08-02

## 2020-08-01 RX ADMIN — LOSARTAN POTASSIUM 25 MILLIGRAM(S): 100 TABLET, FILM COATED ORAL at 05:31

## 2020-08-01 RX ADMIN — Medication 2: at 18:04

## 2020-08-01 RX ADMIN — Medication 400 MILLIGRAM(S): at 13:00

## 2020-08-01 RX ADMIN — SODIUM CHLORIDE 75 MILLILITER(S): 9 INJECTION INTRAMUSCULAR; INTRAVENOUS; SUBCUTANEOUS at 05:28

## 2020-08-01 RX ADMIN — Medication 400 MILLIGRAM(S): at 12:01

## 2020-08-01 RX ADMIN — Medication 400 MILLIGRAM(S): at 18:49

## 2020-08-01 RX ADMIN — PANTOPRAZOLE SODIUM 40 MILLIGRAM(S): 20 TABLET, DELAYED RELEASE ORAL at 10:42

## 2020-08-01 RX ADMIN — HEPARIN SODIUM 5000 UNIT(S): 5000 INJECTION INTRAVENOUS; SUBCUTANEOUS at 15:06

## 2020-08-01 RX ADMIN — Medication 650 MILLIGRAM(S): at 13:00

## 2020-08-01 RX ADMIN — HEPARIN SODIUM 5000 UNIT(S): 5000 INJECTION INTRAVENOUS; SUBCUTANEOUS at 05:31

## 2020-08-01 RX ADMIN — Medication 6: at 12:03

## 2020-08-01 RX ADMIN — OXYCODONE HYDROCHLORIDE 5 MILLIGRAM(S): 5 TABLET ORAL at 11:00

## 2020-08-01 RX ADMIN — HEPARIN SODIUM 5000 UNIT(S): 5000 INJECTION INTRAVENOUS; SUBCUTANEOUS at 21:57

## 2020-08-01 RX ADMIN — Medication 650 MILLIGRAM(S): at 18:05

## 2020-08-01 RX ADMIN — SODIUM CHLORIDE 75 MILLILITER(S): 9 INJECTION INTRAMUSCULAR; INTRAVENOUS; SUBCUTANEOUS at 10:43

## 2020-08-01 RX ADMIN — OXYCODONE HYDROCHLORIDE 5 MILLIGRAM(S): 5 TABLET ORAL at 22:01

## 2020-08-01 RX ADMIN — SIMVASTATIN 20 MILLIGRAM(S): 20 TABLET, FILM COATED ORAL at 21:57

## 2020-08-01 RX ADMIN — SODIUM CHLORIDE 75 MILLILITER(S): 9 INJECTION INTRAMUSCULAR; INTRAVENOUS; SUBCUTANEOUS at 21:53

## 2020-08-01 RX ADMIN — Medication 650 MILLIGRAM(S): at 05:28

## 2020-08-01 RX ADMIN — Medication 400 MILLIGRAM(S): at 05:30

## 2020-08-01 RX ADMIN — Medication 400 MILLIGRAM(S): at 18:05

## 2020-08-01 RX ADMIN — Medication 650 MILLIGRAM(S): at 12:01

## 2020-08-01 RX ADMIN — Medication 1 MILLIGRAM(S): at 12:01

## 2020-08-01 RX ADMIN — Medication 650 MILLIGRAM(S): at 18:49

## 2020-08-01 RX ADMIN — OXYCODONE HYDROCHLORIDE 5 MILLIGRAM(S): 5 TABLET ORAL at 10:44

## 2020-08-01 RX ADMIN — OXYCODONE HYDROCHLORIDE 5 MILLIGRAM(S): 5 TABLET ORAL at 21:31

## 2020-08-01 NOTE — PROGRESS NOTE ADULT - SUBJECTIVE AND OBJECTIVE BOX
GENERAL SURGERY PROGRESS NOTE     PHILIP CASTILLO  Female  Hospital day :1d  POD:  Procedure:   OVERNIGHT EVENTS: Pt had mild pain. No other overnight events. Vitals stable    T(F): 98.7 (20 @ 18:02), Max: 98.7 (20 @ 18:02)  HR: 81 (20 @ 23:57) (81 - 89)  BP: 143/67 (20 @ 23:57) (143/67 - 198/88)  ABP: --  ABP(mean): --  RR: 19 (20 @ 23:57) (17 - 19)  SpO2: 97% (20 @ 23:57) (97% - 100%)    DIET/FLUIDS: dextrose 5%. 1000 milliLiter(s) IV Continuous <Continuous>  folic acid 1 milliGRAM(s) Oral daily  sodium chloride 0.9%. 1000 milliLiter(s) IV Continuous <Continuous>      GI proph:  pantoprazole    Tablet 40 milliGRAM(s) Oral before breakfast    AC/ proph: heparin SubCutaneous Injection - Peds 5000 Unit(s) SubCutaneous every 8 hours    ABx:     PHYSICAL EXAM:  GENERAL: NAD, well-appearing  CHEST/LUNG: Clear to auscultation bilaterally  HEART: Regular rate and rhythm  ABDOMEN: Soft, Nontender, Nondistended;   EXTREMITIES:  Tenderness Lt hip. No clubbing, cyanosis, or edema      LABS  Labs:  CAPILLARY BLOOD GLUCOSE      POCT Blood Glucose.: 156 mg/dL (2020 18:19)                          11.6   12.09 )-----------( 196      ( 2020 18:00 )             33.8       Auto Neutrophil %: 68.4 % (20 @ 18:00)  Auto Immature Granulocyte %: 2.2 % (20 @ 18:00)        135  |  95<L>  |  19  ----------------------------<  149<H>  4.4   |  26  |  1.5      Calcium, Total Serum: 10.0 mg/dL (20 @ 18:00)      LFTs:             7.2  | 0.3  | 27       ------------------[73      ( 2020 18:00 )  4.6  | x    | 26          Lipase:28     Amylase:x         Lactate, Blood: 1.5 mmol/L (20 @ 18:00)      Coags:     11.60  ----< 1.01    ( 2020 18:00 )     30.5              Alcohol, Blood: <10 mg/dL (20 @ 18:00)    Urinalysis Basic - ( 2020 21:45 )    Color: Colorless / Appearance: Clear / S.026 / pH: x  Gluc: x / Ketone: Negative  / Bili: Negative / Urobili: <2 mg/dL   Blood: x / Protein: Trace / Nitrite: Negative   Leuk Esterase: Negative / RBC: x / WBC x   Sq Epi: x / Non Sq Epi: x / Bacteria: x

## 2020-08-01 NOTE — CONSULT NOTE ADULT - SUBJECTIVE AND OBJECTIVE BOX
ORTHOPEDIC SURGERY CONSULT    89y Female here after fall, ortho consulted for left pubic ramus fx. Patient was walking down stairs and lost balance on last step falling onto left side.  States she is unable to ambulate on left or move hip due to pain. Also complaining of mild left wrist pain, left rib pain, head pain.    Denies numbness/tingling.  Denies f/c/n/v/cp/sob.    Medications:  acetaminophen   Tablet .. 650 milliGRAM(s) Oral every 6 hours  dextrose 40% Gel 15 Gram(s) Oral once PRN  dextrose 5%. 1000 milliLiter(s) IV Continuous <Continuous>  dextrose 50% Injectable 12.5 Gram(s) IV Push once  dextrose 50% Injectable 25 Gram(s) IV Push once  dextrose 50% Injectable 25 Gram(s) IV Push once  folic acid 1 milliGRAM(s) Oral daily  glucagon  Injectable 1 milliGRAM(s) IntraMuscular once PRN  heparin SubCutaneous Injection - Peds 5000 Unit(s) SubCutaneous every 8 hours  ibuprofen  Tablet. 400 milliGRAM(s) Oral every 6 hours  insulin lispro (HumaLOG) corrective regimen sliding scale   SubCutaneous three times a day before meals  losartan 25 milliGRAM(s) Oral daily  oxyCODONE    IR 5 milliGRAM(s) Oral every 6 hours PRN  pantoprazole    Tablet 40 milliGRAM(s) Oral before breakfast  simvastatin 20 milliGRAM(s) Oral at bedtime  sodium chloride 0.9%. 1000 milliLiter(s) IV Continuous <Continuous>    No Known Allergies      PMH/PSH:  TIA (transient ischemic attack)  [place] (hard of hearing)  Diabetes mellitus  Hypertension  No significant past surgical history      Exam:  T(C): 37.1 (07-31-20 @ 18:02), Max: 37.1 (07-31-20 @ 18:02)  HR: 81 (07-31-20 @ 23:57) (81 - 89)  BP: 143/67 (07-31-20 @ 23:57) (143/67 - 198/88)  RR: 19 (07-31-20 @ 23:57) (17 - 19)  SpO2: 97% (07-31-20 @ 23:57) (97% - 100%)  General: Awake, alert, NAD    BUE:   No deformity, mild tenderness left wrist otherwise nonttp throughout  grossly NVI    RLE:   No gross deformity, nonttp throughout  Grossly NVI    LLE:  ttp sacrum, pubis  nonttp distally  pain with hip ROM  SILT s/s/sp/dp/t  Motor intact EHL, TA, Gastroc  Palpable DP pulse        Labs:                        11.6   12.09 )-----------( 196      ( 31 Jul 2020 18:00 )             33.8     07-31    135  |  95<L>  |  19  ----------------------------<  149<H>  4.4   |  26  |  1.5    Ca    10.0      31 Jul 2020 18:00    TPro  7.2  /  Alb  4.6  /  TBili  0.3  /  DBili  x   /  AST  27  /  ALT  26  /  AlkPhos  73  07-31    PT/INR - ( 31 Jul 2020 18:00 )   PT: 11.60 sec;   INR: 1.01 ratio         PTT - ( 31 Jul 2020 18:00 )  PTT:30.5 sec    Imaging:   Left inferior pubic ramus fracture with sacrum buckle fracture    A/P:  89y Female with left LC1 pelvis fracture  - no acute orthopedic intervention  - left wrist XR  - WBAT  - pain control  - DVT prophylaxis  - PT/rehab ORTHOPEDIC SURGERY CONSULT    89y Female here after fall, ortho consulted for left pubic ramus fx. Patient was walking down stairs and lost balance on last step falling onto left side.  States she is unable to ambulate on left or move hip due to pain. Also complaining of mild left wrist pain, left rib pain, head pain.    Denies numbness/tingling.  Denies f/c/n/v/cp/sob.    Medications:  acetaminophen   Tablet .. 650 milliGRAM(s) Oral every 6 hours  dextrose 40% Gel 15 Gram(s) Oral once PRN  dextrose 5%. 1000 milliLiter(s) IV Continuous <Continuous>  dextrose 50% Injectable 12.5 Gram(s) IV Push once  dextrose 50% Injectable 25 Gram(s) IV Push once  dextrose 50% Injectable 25 Gram(s) IV Push once  folic acid 1 milliGRAM(s) Oral daily  glucagon  Injectable 1 milliGRAM(s) IntraMuscular once PRN  heparin SubCutaneous Injection - Peds 5000 Unit(s) SubCutaneous every 8 hours  ibuprofen  Tablet. 400 milliGRAM(s) Oral every 6 hours  insulin lispro (HumaLOG) corrective regimen sliding scale   SubCutaneous three times a day before meals  losartan 25 milliGRAM(s) Oral daily  oxyCODONE    IR 5 milliGRAM(s) Oral every 6 hours PRN  pantoprazole    Tablet 40 milliGRAM(s) Oral before breakfast  simvastatin 20 milliGRAM(s) Oral at bedtime  sodium chloride 0.9%. 1000 milliLiter(s) IV Continuous <Continuous>    No Known Allergies      PMH/PSH:  TIA (transient ischemic attack)  [place] (hard of hearing)  Diabetes mellitus  Hypertension  No significant past surgical history      Exam:  T(C): 37.1 (07-31-20 @ 18:02), Max: 37.1 (07-31-20 @ 18:02)  HR: 81 (07-31-20 @ 23:57) (81 - 89)  BP: 143/67 (07-31-20 @ 23:57) (143/67 - 198/88)  RR: 19 (07-31-20 @ 23:57) (17 - 19)  SpO2: 97% (07-31-20 @ 23:57) (97% - 100%)  General: Awake, alert, NAD    BUE:   No deformity, mild tenderness left wrist otherwise nonttp throughout  grossly NVI    RLE:   No gross deformity, nonttp throughout  Grossly NVI    LLE:  ttp sacrum, pubis  nonttp distally  pain with hip ROM  SILT s/s/sp/dp/t  Motor intact EHL, TA, Gastroc  Palpable DP pulse        Labs:                        11.6   12.09 )-----------( 196      ( 31 Jul 2020 18:00 )             33.8     07-31    135  |  95<L>  |  19  ----------------------------<  149<H>  4.4   |  26  |  1.5    Ca    10.0      31 Jul 2020 18:00    TPro  7.2  /  Alb  4.6  /  TBili  0.3  /  DBili  x   /  AST  27  /  ALT  26  /  AlkPhos  73  07-31    PT/INR - ( 31 Jul 2020 18:00 )   PT: 11.60 sec;   INR: 1.01 ratio         PTT - ( 31 Jul 2020 18:00 )  PTT:30.5 sec    Imaging:   Left inferior pubic ramus fracture with sacrum buckle fracture  Left wrist - degenerative changes, no acute injury    A/P:  89y Female with left LC1 pelvis fracture  - no acute orthopedic intervention  - WBAT  - pain control  - DVT prophylaxis  - PT/rehab ORTHOPEDIC SURGERY CONSULT    89y Female here after fall, ortho consulted for left pubic ramus fx. Patient was walking down stairs and lost balance on last step falling onto left side.  States she is unable to ambulate on left or move hip due to pain. Also complaining of mild left wrist pain, left rib pain, head pain.    Denies numbness/tingling.  Denies f/c/n/v/cp/sob.    Medications:  acetaminophen   Tablet .. 650 milliGRAM(s) Oral every 6 hours  dextrose 40% Gel 15 Gram(s) Oral once PRN  dextrose 5%. 1000 milliLiter(s) IV Continuous <Continuous>  dextrose 50% Injectable 12.5 Gram(s) IV Push once  dextrose 50% Injectable 25 Gram(s) IV Push once  dextrose 50% Injectable 25 Gram(s) IV Push once  folic acid 1 milliGRAM(s) Oral daily  glucagon  Injectable 1 milliGRAM(s) IntraMuscular once PRN  heparin SubCutaneous Injection - Peds 5000 Unit(s) SubCutaneous every 8 hours  ibuprofen  Tablet. 400 milliGRAM(s) Oral every 6 hours  insulin lispro (HumaLOG) corrective regimen sliding scale   SubCutaneous three times a day before meals  losartan 25 milliGRAM(s) Oral daily  oxyCODONE    IR 5 milliGRAM(s) Oral every 6 hours PRN  pantoprazole    Tablet 40 milliGRAM(s) Oral before breakfast  simvastatin 20 milliGRAM(s) Oral at bedtime  sodium chloride 0.9%. 1000 milliLiter(s) IV Continuous <Continuous>    No Known Allergies      PMH/PSH:  TIA (transient ischemic attack)  [place] (hard of hearing)  Diabetes mellitus  Hypertension  No significant past surgical history      Exam:  T(C): 37.1 (07-31-20 @ 18:02), Max: 37.1 (07-31-20 @ 18:02)  HR: 81 (07-31-20 @ 23:57) (81 - 89)  BP: 143/67 (07-31-20 @ 23:57) (143/67 - 198/88)  RR: 19 (07-31-20 @ 23:57) (17 - 19)  SpO2: 97% (07-31-20 @ 23:57) (97% - 100%)  General: Awake, alert, NAD    BUE:   No deformity, mild tenderness left wrist otherwise nonttp throughout  grossly NVI    RLE:   No gross deformity, nonttp throughout  Grossly NVI    LLE:  ttp sacrum, pubis  nonttp distally  pain with hip ROM  SILT s/s/sp/dp/t  Motor intact EHL, TA, Gastroc  Palpable DP pulse        Labs:                        11.6   12.09 )-----------( 196      ( 31 Jul 2020 18:00 )             33.8     07-31    135  |  95<L>  |  19  ----------------------------<  149<H>  4.4   |  26  |  1.5    Ca    10.0      31 Jul 2020 18:00    TPro  7.2  /  Alb  4.6  /  TBili  0.3  /  DBili  x   /  AST  27  /  ALT  26  /  AlkPhos  73  07-31    PT/INR - ( 31 Jul 2020 18:00 )   PT: 11.60 sec;   INR: 1.01 ratio         PTT - ( 31 Jul 2020 18:00 )  PTT:30.5 sec    Imaging:   Left inferior pubic ramus fracture with sacrum buckle fracture  Left wrist - degenerative changes, no acute injury    A/P:  89y Female with left LC1 pelvis fracture  - no acute orthopedic intervention  - WBAT  - pain control  - DVT prophylaxis  pt seen and examined  agree with plan  - PT/rehab

## 2020-08-01 NOTE — CONSULT NOTE ADULT - SUBJECTIVE AND OBJECTIVE BOX
HPI:  PHILIP CASTILLO [id number]    HPI:  89 y.o. right-handed Female with PMH of hard of hearing, Diabetes mellitus, Hypertension, TIAx1, came to the ED s/p Fall. Pt was walking down the stairs and after reaching the bottom step she lost her balance. Pt fell on her left side and complains of head, lower chest, wrist and leg pain. +HT, -LOC, -AC, GCS 15. Pt was not able to get up but was helped up to the chair when help arrived and then brought to the ED. Pt denies chest pain, sob, abd pain, nausea, vomiting, dizziness. Pt is unable to ambulate and ROM is restricted due to pain. (2020 22:48)      PAST MEDICAL & SURGICAL HISTORY:  TIA (transient ischemic attack): Over ten years ago; was on plavix now DC  [place] (hard of hearing)  Diabetes mellitus  Hypertension  No significant past surgical history      HOSPITAL COURSE:    TODAY'S SUBJECTIVE & REVIEW OF SYMPTOMS:    Constitutional WNL  Cardiac WNL   Respiratory WNL  GI WNL   WNL  Endocrine WNL  Skin WNL  Musculoskeletal + left groin pain  Neuro + [place]  Cognitive WNL  Psych WNL      MEDICATIONS  (STANDING):  acetaminophen   Tablet .. 650 milliGRAM(s) Oral every 6 hours  dextrose 5%. 1000 milliLiter(s) (50 mL/Hr) IV Continuous <Continuous>  dextrose 50% Injectable 12.5 Gram(s) IV Push once  dextrose 50% Injectable 25 Gram(s) IV Push once  dextrose 50% Injectable 25 Gram(s) IV Push once  folic acid 1 milliGRAM(s) Oral daily  heparin SubCutaneous Injection - Peds 5000 Unit(s) SubCutaneous every 8 hours  ibuprofen  Tablet. 400 milliGRAM(s) Oral every 6 hours  insulin lispro (HumaLOG) corrective regimen sliding scale   SubCutaneous three times a day before meals  losartan 25 milliGRAM(s) Oral daily  pantoprazole    Tablet 40 milliGRAM(s) Oral before breakfast  simvastatin 20 milliGRAM(s) Oral at bedtime  sodium chloride 0.9%. 1000 milliLiter(s) (75 mL/Hr) IV Continuous <Continuous>    MEDICATIONS  (PRN):  dextrose 40% Gel 15 Gram(s) Oral once PRN Blood Glucose LESS THAN 70 milliGRAM(s)/deciliter  glucagon  Injectable 1 milliGRAM(s) IntraMuscular once PRN Glucose LESS THAN 70 milligrams/deciliter  oxyCODONE    IR 5 milliGRAM(s) Oral every 6 hours PRN Severe Pain (7 - 10)      FAMILY HISTORY:      Allergies    No Known Allergies    Intolerances        SOCIAL HISTORY:    [  ] Smoking  [  ] EtOH  [  ] Substance abuse     Home Environment:  [  ] Alone  [ X ] Lives with Family--daughter  [  ] Home Health Aide    Dwelling:  [ X ] Private House  [  ] Apartment  [  ] Adult Home/Assisted Living Facility  [  ] Skilled Nursing Facility      [  ] Short Term  [  ] Long Term  [ X ] Stairs 4 to enter      Elevator [  ]    FUNCTIONAL STATUS PTA: (Check all that apply)  Ambulation: [ X  ]Independent    [  ] Dependent     [  ] Non-Ambulatory  Assistive Device: [  ] Straight Cane  [  ]  Quad Cane  [  ] Walker  [  ]  Wheelchair  ADL: [ X ] Independent  [  ]  Dependent       Vital Signs Last 24 Hrs  T(C): 36.7 (01 Aug 2020 07:54), Max: 37.1 (2020 18:02)  T(F): 98.1 (01 Aug 2020 07:54), Max: 98.7 (2020 18:02)  HR: 74 (01 Aug 2020 07:54) (74 - 89)  BP: 136/70 (01 Aug 2020 07:54) (136/70 - 198/88)  BP(mean): --  RR: 18 (01 Aug 2020 07:54) (17 - 19)  SpO2: 98% (01 Aug 2020 07:54) (97% - 100%)      PHYSICAL EXAM: Alert and oriented X 4  GENERAL: Well-developed, well-nourished, in NAD  HEAD:  Normocephalic, atraumatic  EYES: EOMI, PERRLA, sclerae anicteric, conjunctivae not injected  NECK: Supple, No JVD, Normal thyroid  CHEST/LUNG: Clear to auscultation bilaterally; No rales, rhonchi, wheezing  HEART: Regular rate and rhythm; No murmurs, gallops, or rubs  ABDOMEN: Soft, nontender, nondistended; Bowel sounds present  EXTREMITIES:  2+ Peripheral pulses; No clubbing, cyanosis, or edema    NERVOUS SYSTEM:  Cranial Nerves II-XII intact [  ] Abnormal  [X  ] +[place]  ROM: WFL all extremities [ X ]  Abnormal [  ]  Motor Strength: WFL all extremities  [  ]  Abnormal [X  ]  Sensation: intact to light touch [  ] Abnormal [  ]  Reflexes: Symmetric [  ]  Abnormal [  ]    FUNCTIONAL STATUS:  Bed Mobility: Independent [  ]  Supervision [X  ]  Needs Assistance [  ]  N/A [  ]  Transfers: Independent [  ]  Supervision [X  ]  Needs Assistance [  ]  N/A [  ]   Ambulation: Independent [  ]  Supervision [X  ]  Needs Assistance [  ]  N/A [  ]  ADLs: Independent [  ] Requires Assistance [  ] N/A [  ]      LABS:                        11.6   12.09 )-----------( 196      ( 2020 18:00 )             33.8         135  |  95<L>  |  19  ----------------------------<  149<H>  4.4   |  26  |  1.5    Ca    10.0      2020 18:00    TPro  7.2  /  Alb  4.6  /  TBili  0.3  /  DBili  x   /  AST  27  /  ALT  26  /  AlkPhos  73      PT/INR - ( 2020 18:00 )   PT: 11.60 sec;   INR: 1.01 ratio         PTT - ( 2020 18:00 )  PTT:30.5 sec  Urinalysis Basic - ( 2020 21:45 )    Color: Colorless / Appearance: Clear / S.026 / pH: x  Gluc: x / Ketone: Negative  / Bili: Negative / Urobili: <2 mg/dL   Blood: x / Protein: Trace / Nitrite: Negative   Leuk Esterase: Negative / RBC: x / WBC x   Sq Epi: x / Non Sq Epi: x / Bacteria: x        RADIOLOGY & ADDITIONAL STUDIES:    EXAM:  XR WRIST 2 VIEWS LT            PROCEDURE DATE:  2020            INTERPRETATION:  Clinical History / Reason for exam: Pain    technique: 2 views of the left wrist    Findings/  impression: No acutely displaced fracture. Well-corticated ossification adjacent to the trapezium is chronic. There is mild radiocarpal and severe basal joint degenerative change. Soft tissues intact.      EXAM:  CT MAXILLOFACIAL            PROCEDURE DATE:  2020            INTERPRETATION:  CLINICAL HISTORY: Jaw hematoma.    TECHNIQUE: Multiple contiguous axial CT images were obtained through the maxillofacial region/paranasal sinuses with sagittal and coronal reformatted images without administration of intravenous contrast.    COMPARISON: Correlation is made with CT head dated 2020.      FINDINGS:    Subcutaneous fat stranding overlying the left mandibular ramus. No organized fluid collection. No underlying fracture or mandibular subluxation/dislocation.    The orbital rims, zygomatic arches, pterygoid plates are intact bilaterally. The globes are symmetric and normal in appearance. The lenses are normally located bilaterally. No retro-orbital hematoma.    The mandible is intact. Dental prosthesis is noted. The mastoid air cells are clear bilaterally.    The nasal bones are intact. Leftward deviation and spurring of the nasal septum.    The paranasal sinuses are clear.    Effacement of the right vallecula and pyriform sinus are noted, possibly on the basis of mucoid debris.      IMPRESSION:    Focal soft tissue edema overlying the left mandibular ramus without encapsulated hematoma, drainable fluid collection, or underlying bony fracture/dislocation.      EXAM:  CT ABDOMEN AND PELVIS IC        EXAM:  CT CHEST IC            PROCEDURE DATE:  2020            INTERPRETATION:  REASON FOR EXAM / CLINICAL STATEMENT: Trauma; Fall. - pmh dm, hld, p/w mechanical fall  c/o L head, shoulder, and hip  pain    TECHNIQUE: Contiguous axial CT images were obtained from the thoracic inlet to the pubic symphysis with intravenous contrast. Reformatted images in the coronal and sagittal planes were acquired.    COMPARISON CT: Chest CT dated 2020    OTHER STUDIES USED FOR CORRELATION: None.      FINDINGS CHEST:    TUBES/LINES: None    HEART AND VESSELS: The heart is normal in size. There are coronary artery calcifications.  No evidence of central pulmonary embolism. There is no pericardial effusion.    Aortic calcifications are noted. There is no evidence of aortic aneurysm or dissection. The ascending thoracic aorta measures 3.4 cm in diameter; the descending thoracic aorta measures 2.7 cm; the main pulmonary artery measures 2.6 cm in diameter.    Brachiocephalic vessels are unremarkable.    MEDIASTINUM: There are no enlarged mediastinal, hilar or axillary lymph nodes. The visualized portion of the thyroid gland is unremarkable.    AIRWAYS, LUNGS AND PLEURA: The central tracheobronchial tree is patent. There are mild dependent atelectatic changes at the lung bases, otherwise the lungs are clear.  There is no pleural effusion. There is no pneumothorax.        FINDINGS ABDOMEN AND PELVIS:    HEPATIC: The liver is normal in appearance with noevidence of mass or bile duct dilatation. The portal vein is patent. The hepatic veins are opacified.    BILIARY: Cholelithiasis    SPLEEN: Unremarkable.    PANCREAS: The pancreas is normal in size and configuration. No evidence of mass or pancreatitis.    ADRENAL GLANDS: The right adrenal gland is unremarkable. There is a 1 cm nodule in the left adrenal gland.    KIDNEYS: There is symmetric bilateral renal enhancement. No evidence of hydronephrosis, calcified stones, or solid mass. Bilateral renal scarring is noted.    ABDOMINOPELVIC NODES: Unremarkable.    PELVIC ORGANS: Uterus and adnexa are unremarkable. No evidence of pelvic mass, lymphadenopathy, or fluid collection.    PERITONEUM/MESENTERY/BOWEL: Colonic diverticula noted with no evidence of diverticulitis. No evidence of bowel obstruction, colitis, inflammatory process, or ascites within the abdomen or pelvis. The appendix is normal in appearance.    BONES/SOFT TISSUES: Degenerative changes of the spine are noted. Injection granulomas are noted in the buttocks. There is deformity of the pubic symphysis which may related to old trauma and/or degenerative change.  There is also a fracture of the left inferior pubic ramus, of indeterminate age, which may be acute.    OTHER: Aortoiliac calcifications are noted with no evidence of abdominal aortic aneurysm.      IMPRESSION:    No evidence of intra-thoracic, abdominal or pelvic visceral laceration or hematoma.    There is deformity of the pubic symphysis which may related to oldtrauma and/or degenerative change. There is also a fracture of the left inferior pubic ramus, of indeterminate age, which may be acute.

## 2020-08-01 NOTE — CONSULT NOTE ADULT - ASSESSMENT
IMPRESSION: Rehabilitation for fall, left inferior pubic ramus fx    PRECAUTIONS: [  ] Cardiac  [  ] Respiratory  [  ] Seizures [  ] Contact Precautions  [  ] Droplet Isolation  [  ] Other:      Weight Bearing Status:  WBAT    RECOMMENDATION:    Out of bed to chair     DVT/decubitus ulcer prophylaxis    REHABILITATION PLAN:     [  X ] Bedside PT 3-5 times a week   [   ]   Bedside OT  2-3 times a week             [   ] No Rehab Therapy Indicated                   [   ]  Speech Therapy   Conditioning/ROM                                    ADL  Bed Mobility                                               Conditioning/ROM  Transfers                                                     Bed Mobility  Sitting /Standing Balance                         Transfers                                        Gait Training                                               Sitting/Standing Balance  Stair Training  [ X  ] Applicable                    Home Equipment Evaluation                                                                        Splinting  [   ] Only      GOALS:   ADL   [   ]   Independent                    Transfers  [  X ] Independent                          Ambulation  [  X ] Independent     [    X] With device                            [   ]  CG                                                         [   ]  CG                                                                  [   ] CG                            [    ] Min A                                                   [   ] Min A                                                              [   ] Min  A          DISCHARGE PLAN:  [    ]  Good candidate for Intensive Rehabilitation/Hospital-based 4A [facility]                                             Will tolerate 3 hours of Intensive Rehab Daily                                       [   X ]  Short Term Rehabilitation in Skilled Nursing Facility                                       [  X  ]  Home with Outpatient or  services                                         [    ]  Possible Candidate for Intensive Hospital-Based Rehabilitation      Thank you.

## 2020-08-01 NOTE — PATIENT PROFILE ADULT - NSASFALLNEEDSASSISTWITH_GEN_A_NUR
Spoke with pt who declined Cardiac Rehab at this time due to continuing issues with short term disability. Suggested if he is able to join [facility] or another fitness center or walk 3-5 times per week if able for exercise.
walking/standing/toileting

## 2020-08-01 NOTE — PROGRESS NOTE ADULT - ASSESSMENT
---------------------------------------------------------------------------------------    ASSESSMENT: 89y Female with PMH of hard of hearing, Diabetes mellitus, Hypertension, TIAx1, came to the ED s/p Fall with age indeterminate vs. acute left inf pubic rami and pubic symphysis fracture and new onset difficulty walking.    PLAN:   -F/U Xray wrist  -PT/Rehab   -Pain control  -Labs        --------------------------------------------------------------------------------------    08-01-20 @ 04:40

## 2020-08-02 LAB
ANION GAP SERPL CALC-SCNC: 11 MMOL/L — SIGNIFICANT CHANGE UP (ref 7–14)
BASOPHILS # BLD AUTO: 0.03 K/UL — SIGNIFICANT CHANGE UP (ref 0–0.2)
BASOPHILS NFR BLD AUTO: 0.3 % — SIGNIFICANT CHANGE UP (ref 0–1)
BUN SERPL-MCNC: 13 MG/DL — SIGNIFICANT CHANGE UP (ref 10–20)
CALCIUM SERPL-MCNC: 8.8 MG/DL — SIGNIFICANT CHANGE UP (ref 8.5–10.1)
CHLORIDE SERPL-SCNC: 98 MMOL/L — SIGNIFICANT CHANGE UP (ref 98–110)
CK MB CFR SERPL CALC: 3.2 NG/ML — SIGNIFICANT CHANGE UP (ref 0.6–6.3)
CK SERPL-CCNC: 160 U/L — SIGNIFICANT CHANGE UP (ref 0–225)
CO2 SERPL-SCNC: 25 MMOL/L — SIGNIFICANT CHANGE UP (ref 17–32)
CREAT SERPL-MCNC: 1 MG/DL — SIGNIFICANT CHANGE UP (ref 0.7–1.5)
EOSINOPHIL # BLD AUTO: 0.13 K/UL — SIGNIFICANT CHANGE UP (ref 0–0.7)
EOSINOPHIL NFR BLD AUTO: 1.3 % — SIGNIFICANT CHANGE UP (ref 0–8)
GLUCOSE BLDC GLUCOMTR-MCNC: 131 MG/DL — HIGH (ref 70–99)
GLUCOSE BLDC GLUCOMTR-MCNC: 158 MG/DL — HIGH (ref 70–99)
GLUCOSE BLDC GLUCOMTR-MCNC: 162 MG/DL — HIGH (ref 70–99)
GLUCOSE BLDC GLUCOMTR-MCNC: 191 MG/DL — HIGH (ref 70–99)
GLUCOSE SERPL-MCNC: 157 MG/DL — HIGH (ref 70–99)
HCT VFR BLD CALC: 34.3 % — LOW (ref 37–47)
HGB BLD-MCNC: 11.6 G/DL — LOW (ref 12–16)
IMM GRANULOCYTES NFR BLD AUTO: 0.7 % — HIGH (ref 0.1–0.3)
LYMPHOCYTES # BLD AUTO: 1.44 K/UL — SIGNIFICANT CHANGE UP (ref 1.2–3.4)
LYMPHOCYTES # BLD AUTO: 14.6 % — LOW (ref 20.5–51.1)
MAGNESIUM SERPL-MCNC: 2 MG/DL — SIGNIFICANT CHANGE UP (ref 1.8–2.4)
MCHC RBC-ENTMCNC: 33.8 G/DL — SIGNIFICANT CHANGE UP (ref 32–37)
MCHC RBC-ENTMCNC: 34 PG — HIGH (ref 27–31)
MCV RBC AUTO: 100.6 FL — HIGH (ref 81–99)
MONOCYTES # BLD AUTO: 0.79 K/UL — HIGH (ref 0.1–0.6)
MONOCYTES NFR BLD AUTO: 8 % — SIGNIFICANT CHANGE UP (ref 1.7–9.3)
NEUTROPHILS # BLD AUTO: 7.4 K/UL — HIGH (ref 1.4–6.5)
NEUTROPHILS NFR BLD AUTO: 75.1 % — SIGNIFICANT CHANGE UP (ref 42.2–75.2)
NRBC # BLD: 0 /100 WBCS — SIGNIFICANT CHANGE UP (ref 0–0)
PHOSPHATE SERPL-MCNC: 3.8 MG/DL — SIGNIFICANT CHANGE UP (ref 2.1–4.9)
PLATELET # BLD AUTO: 160 K/UL — SIGNIFICANT CHANGE UP (ref 130–400)
POTASSIUM SERPL-MCNC: 4.1 MMOL/L — SIGNIFICANT CHANGE UP (ref 3.5–5)
POTASSIUM SERPL-SCNC: 4.1 MMOL/L — SIGNIFICANT CHANGE UP (ref 3.5–5)
RBC # BLD: 3.41 M/UL — LOW (ref 4.2–5.4)
RBC # FLD: 12.6 % — SIGNIFICANT CHANGE UP (ref 11.5–14.5)
SODIUM SERPL-SCNC: 134 MMOL/L — LOW (ref 135–146)
TROPONIN T SERPL-MCNC: <0.01 NG/ML — SIGNIFICANT CHANGE UP
WBC # BLD: 9.86 K/UL — SIGNIFICANT CHANGE UP (ref 4.8–10.8)
WBC # FLD AUTO: 9.86 K/UL — SIGNIFICANT CHANGE UP (ref 4.8–10.8)

## 2020-08-02 PROCEDURE — 99232 SBSQ HOSP IP/OBS MODERATE 35: CPT

## 2020-08-02 PROCEDURE — 93010 ELECTROCARDIOGRAM REPORT: CPT

## 2020-08-02 RX ORDER — SENNA PLUS 8.6 MG/1
2 TABLET ORAL AT BEDTIME
Refills: 0 | Status: DISCONTINUED | OUTPATIENT
Start: 2020-08-02 | End: 2020-08-04

## 2020-08-02 RX ORDER — LABETALOL HCL 100 MG
10 TABLET ORAL ONCE
Refills: 0 | Status: COMPLETED | OUTPATIENT
Start: 2020-08-02 | End: 2020-08-02

## 2020-08-02 RX ORDER — POLYETHYLENE GLYCOL 3350 17 G/17G
17 POWDER, FOR SOLUTION ORAL DAILY
Refills: 0 | Status: DISCONTINUED | OUTPATIENT
Start: 2020-08-02 | End: 2020-08-04

## 2020-08-02 RX ORDER — ONDANSETRON 8 MG/1
4 TABLET, FILM COATED ORAL ONCE
Refills: 0 | Status: DISCONTINUED | OUTPATIENT
Start: 2020-08-02 | End: 2020-08-03

## 2020-08-02 RX ADMIN — HEPARIN SODIUM 5000 UNIT(S): 5000 INJECTION INTRAVENOUS; SUBCUTANEOUS at 14:08

## 2020-08-02 RX ADMIN — PANTOPRAZOLE SODIUM 40 MILLIGRAM(S): 20 TABLET, DELAYED RELEASE ORAL at 05:29

## 2020-08-02 RX ADMIN — Medication 400 MILLIGRAM(S): at 17:16

## 2020-08-02 RX ADMIN — SENNA PLUS 2 TABLET(S): 8.6 TABLET ORAL at 21:30

## 2020-08-02 RX ADMIN — Medication 650 MILLIGRAM(S): at 05:29

## 2020-08-02 RX ADMIN — SIMVASTATIN 20 MILLIGRAM(S): 20 TABLET, FILM COATED ORAL at 21:30

## 2020-08-02 RX ADMIN — HEPARIN SODIUM 5000 UNIT(S): 5000 INJECTION INTRAVENOUS; SUBCUTANEOUS at 21:30

## 2020-08-02 RX ADMIN — HEPARIN SODIUM 5000 UNIT(S): 5000 INJECTION INTRAVENOUS; SUBCUTANEOUS at 05:28

## 2020-08-02 RX ADMIN — LOSARTAN POTASSIUM 25 MILLIGRAM(S): 100 TABLET, FILM COATED ORAL at 05:28

## 2020-08-02 RX ADMIN — Medication 2: at 11:12

## 2020-08-02 RX ADMIN — Medication 2: at 16:31

## 2020-08-02 RX ADMIN — Medication 1 MILLIGRAM(S): at 11:14

## 2020-08-02 RX ADMIN — Medication 650 MILLIGRAM(S): at 11:13

## 2020-08-02 RX ADMIN — Medication 650 MILLIGRAM(S): at 17:15

## 2020-08-02 RX ADMIN — Medication 650 MILLIGRAM(S): at 23:23

## 2020-08-02 RX ADMIN — POTASSIUM PHOSPHATE, MONOBASIC POTASSIUM PHOSPHATE, DIBASIC 83.33 MILLIMOLE(S): 236; 224 INJECTION, SOLUTION INTRAVENOUS at 00:21

## 2020-08-02 RX ADMIN — Medication 10 MILLIGRAM(S): at 01:14

## 2020-08-02 RX ADMIN — POLYETHYLENE GLYCOL 3350 17 GRAM(S): 17 POWDER, FOR SOLUTION ORAL at 11:14

## 2020-08-02 RX ADMIN — Medication 400 MILLIGRAM(S): at 05:29

## 2020-08-02 RX ADMIN — Medication 400 MILLIGRAM(S): at 11:14

## 2020-08-02 RX ADMIN — Medication 400 MILLIGRAM(S): at 23:24

## 2020-08-02 NOTE — PROGRESS NOTE ADULT - SUBJECTIVE AND OBJECTIVE BOX
GENERAL SURGERY PROGRESS NOTE     PHILIP CASTILLO  [address] [place] [address] day :2d  POD:  Procedure:   Surgical Attending: Jaden Jerry      24H events/hpi:  Patient seen & examined at bedside. Tolerating diet, +voiding.  Pt with a lot of dyspepsia  overnight pt with chest pain and BP to 198/90, was given push of labetalol 10mg, stat EKG ordered and STAT labs and cardiac enzymes drawn. will f/u       T(F): 96.2 (20 @ 01:00), Max: 98.9 (20 @ 16:51)  HR: 90 (20 @ 01:00) (72 - 90)  BP: 198/92 (20 @ 01:00) (136/70 - 198/92)  ABP: --  ABP(mean): --  RR: 16 (20 @ 01:00) (16 - 20)  SpO2: 98% (20 @ 01:00) (97% - 98%)      DIET/FLUIDS: dextrose 5%. 1000 milliLiter(s) IV Continuous <Continuous>  folic acid 1 milliGRAM(s) Oral daily  sodium chloride 0.9%. 1000 milliLiter(s) IV Continuous <Continuous>      I&O      URINE:    GI proph:  pantoprazole    Tablet 40 milliGRAM(s) Oral before breakfast    AC/ proph: heparin SubCutaneous Injection - Peds 5000 Unit(s) SubCutaneous every 8 hours    ABx:     PHYSICAL EXAM:  GENERAL: mild distress, dyspepsia and c/o mild chest pain, ecchymoses on left preauricular area  CHEST/LUNG: Clear to auscultation bilaterally  HEART: Regular rate and rhythm  ABDOMEN: Soft, Nontender, Nondistended;   EXTREMITIES:  No clubbing, cyanosis, or edema      LABS  Labs:  CAPILLARY BLOOD GLUCOSE      POCT Blood Glucose.: 126 mg/dL (01 Aug 2020 21:38)  POCT Blood Glucose.: 184 mg/dL (01 Aug 2020 17:08)  POCT Blood Glucose.: 253 mg/dL (01 Aug 2020 11:27)  POCT Blood Glucose.: 145 mg/dL (01 Aug 2020 08:36)                          10.3   8.40  )-----------( 153      ( 01 Aug 2020 22:12 )             30.0       Auto Neutrophil %: 70.3 % (20 @ 22:12)  Auto Immature Granulocyte %: 1.0 % (20 @ 22:12)        135  |  101  |  14  ----------------------------<  127<H>  3.4<L>   |  24  |  1.0      Calcium, Total Serum: 8.5 mg/dL (20 @ 22:12)      LFTs:             7.2  | 0.3  | 27       ------------------[73      ( 2020 18:00 )  4.6  | x    | 26          Lipase:28     Amylase:x         Lactate, Blood: 1.5 mmol/L (20 @ 18:00)      Coags:     11.60  ----< 1.01    ( 2020 18:00 )     30.5                Urinalysis Basic - ( 2020 21:45 )    Color: Colorless / Appearance: Clear / S.026 / pH: x  Gluc: x / Ketone: Negative  / Bili: Negative / Urobili: <2 mg/dL   Blood: x / Protein: Trace / Nitrite: Negative   Leuk Esterase: Negative / RBC: x / WBC x   Sq Epi: x / Non Sq Epi: x / Bacteria: x GENERAL SURGERY PROGRESS NOTE     PHILIP CASTILLO  [address] [place] [address] day :2d  POD:  Procedure:   Surgical Attending: Jaden Jerry      24H events/hpi:  Patient seen & examined at bedside. Tolerating diet, +voiding.  Pt with a lot of dyspepsia  overnight pt with chest pain and BP to 198/90, was given push of labetalol 10mg, stat EKG ordered and STAT labs and cardiac enzymes drawn. will f/u   Cr was 1.5 yesterday kept on fluids cr now 1.0 fluids d/c'd      T(F): 96.2 (20 @ 01:00), Max: 98.9 (20 @ 16:51)  HR: 90 (20 @ 01:00) (72 - 90)  BP: 198/92 (20 @ 01:00) (136/70 - 198/92)  ABP: --  ABP(mean): --  RR: 16 (20 @ 01:00) (16 - 20)  SpO2: 98% (20 @ 01:00) (97% - 98%)      DIET/FLUIDS: dextrose 5%. 1000 milliLiter(s) IV Continuous <Continuous>  folic acid 1 milliGRAM(s) Oral daily  sodium chloride 0.9%. 1000 milliLiter(s) IV Continuous <Continuous>      I&O      URINE:    GI proph:  pantoprazole    Tablet 40 milliGRAM(s) Oral before breakfast    AC/ proph: heparin SubCutaneous Injection - Peds 5000 Unit(s) SubCutaneous every 8 hours    ABx:     PHYSICAL EXAM:  GENERAL: mild distress, dyspepsia and c/o mild chest pain, ecchymoses on left preauricular area  CHEST/LUNG: Clear to auscultation bilaterally  HEART: Regular rate and rhythm  ABDOMEN: Soft, Nontender, Nondistended;   EXTREMITIES:  No clubbing, cyanosis, or edema      LABS  Labs:  CAPILLARY BLOOD GLUCOSE      POCT Blood Glucose.: 126 mg/dL (01 Aug 2020 21:38)  POCT Blood Glucose.: 184 mg/dL (01 Aug 2020 17:08)  POCT Blood Glucose.: 253 mg/dL (01 Aug 2020 11:27)  POCT Blood Glucose.: 145 mg/dL (01 Aug 2020 08:36)                          10.3   8.40  )-----------( 153      ( 01 Aug 2020 22:12 )             30.0       Auto Neutrophil %: 70.3 % (20 @ 22:12)  Auto Immature Granulocyte %: 1.0 % (20 @ 22:12)        135  |  101  |  14  ----------------------------<  127<H>  3.4<L>   |  24  |  1.0      Calcium, Total Serum: 8.5 mg/dL (20 @ 22:12)      LFTs:             7.2  | 0.3  | 27       ------------------[73      ( 2020 18:00 )  4.6  | x    | 26          Lipase:28     Amylase:x         Lactate, Blood: 1.5 mmol/L (20 @ 18:00)      Coags:     11.60  ----< 1.01    ( 2020 18:00 )     30.5                Urinalysis Basic - ( 2020 21:45 )    Color: Colorless / Appearance: Clear / S.026 / pH: x  Gluc: x / Ketone: Negative  / Bili: Negative / Urobili: <2 mg/dL   Blood: x / Protein: Trace / Nitrite: Negative   Leuk Esterase: Negative / RBC: x / WBC x   Sq Epi: x / Non Sq Epi: x / Bacteria: x

## 2020-08-02 NOTE — PHYSICAL THERAPY INITIAL EVALUATION ADULT - PLANNED THERAPY INTERVENTIONS, PT EVAL
transfer training/lumbar stabilization/stretching/neuromuscular re-education/ROM/balance training/bed mobility training/gait training/postural re-education/manual therapy techniques/strengthening

## 2020-08-02 NOTE — PHYSICAL THERAPY INITIAL EVALUATION ADULT - PERTINENT HX OF CURRENT PROBLEM, REHAB EVAL
Pt is s/p mechanical fall down step at home c + HT, -LOC. Presents to [facility] c + L inferior pubic ramus fx.

## 2020-08-02 NOTE — PHYSICAL THERAPY INITIAL EVALUATION ADULT - CRITERIA FOR SKILLED THERAPEUTIC INTERVENTIONS
anticipated equipment needs at discharge/anticipated discharge recommendation/rehab potential/therapy frequency/impairments found/functional limitations in following categories/predicted duration of therapy intervention

## 2020-08-02 NOTE — PHYSICAL THERAPY INITIAL EVALUATION ADULT - GENERAL OBSERVATIONS, REHAB EVAL
Held off PT eval pending OOB orders. To f/u when appropriate.
Pt laying semifowler in bed c c/o pain in L lateral and anterior hip, unable to quantify. Agreeable to PT IE.

## 2020-08-02 NOTE — PHYSICAL THERAPY INITIAL EVALUATION ADULT - GAIT DEVIATIONS NOTED, PT EVAL
decreased velocity of limb motion/decreased step length/decreased stride length/decreased weight-shifting ability/weight shifting ability severely affected

## 2020-08-02 NOTE — PROGRESS NOTE ADULT - ASSESSMENT
A/P:  PHILIP CASTILLO is a 89yFemale HD2  from fall and left pubic rami fracture      Plan:   - f/u EKG  - f/u cardiac enzymes and labs  - OOBAT with help  - IS  - f/u vitals, BP and O2  - f/u labs & replete if necessary  - DVT PPX  - GI PPX  - f/u w sw/cm A/P:  PHILIP CASTILLO is a 89yFemale HD2  from fall and left pubic rami fracture      Plan:   - f/u EKG  - f/u cardiac enzymes and labs  - OOBAT with help  - IS  - f/u vitals, BP and O2  - f/u labs & replete if necessary  - DVT PPX  - GI PPX  - zofran given for nausea  - overnight pt with chest pain and BP to 198/90, was given push of labetalol 10mg, stat EKG ordered and STAT labs and cardiac enzymes drawn. will f/u   - Cr was 1.5 yesterday kept on fluids cr now 1.0 fluids d/c'd  - f/u w sw/cm A/P:  PHILIP CASTILLO is a 89yFemale HD2  from fall and left pubic rami fracture      Plan:   - f/u EKG  - f/u cardiac enzymes (trop <0.01) and labs  - OOBAT with help  - IS  - f/u vitals, BP and O2  - f/u labs & replete if necessary  - DVT PPX  - GI PPX  - zofran given for nausea  - overnight pt with chest pain and BP to 198/90, was given push of labetalol 10mg, stat EKG ordered and STAT labs and cardiac enzymes drawn. will f/u   - Cr was 1.5 yesterday kept on fluids cr now 1.0 fluids d/c'd  - f/u w sw/cm

## 2020-08-03 ENCOUNTER — TRANSCRIPTION ENCOUNTER (OUTPATIENT)
Age: 85
End: 2020-08-03

## 2020-08-03 LAB
CK MB CFR SERPL CALC: 2.6 NG/ML — SIGNIFICANT CHANGE UP (ref 0.6–6.3)
GLUCOSE BLDC GLUCOMTR-MCNC: 130 MG/DL — HIGH (ref 70–99)
GLUCOSE BLDC GLUCOMTR-MCNC: 134 MG/DL — HIGH (ref 70–99)
GLUCOSE BLDC GLUCOMTR-MCNC: 159 MG/DL — HIGH (ref 70–99)
GLUCOSE BLDC GLUCOMTR-MCNC: 218 MG/DL — HIGH (ref 70–99)
SARS-COV-2 RNA SPEC QL NAA+PROBE: SIGNIFICANT CHANGE UP
TROPONIN T SERPL-MCNC: <0.01 NG/ML — SIGNIFICANT CHANGE UP

## 2020-08-03 PROCEDURE — 99232 SBSQ HOSP IP/OBS MODERATE 35: CPT

## 2020-08-03 PROCEDURE — 93010 ELECTROCARDIOGRAM REPORT: CPT

## 2020-08-03 RX ORDER — ONDANSETRON 8 MG/1
4 TABLET, FILM COATED ORAL ONCE
Refills: 0 | Status: COMPLETED | OUTPATIENT
Start: 2020-08-03 | End: 2020-08-03

## 2020-08-03 RX ORDER — HEPARIN SODIUM 5000 [USP'U]/ML
5000 INJECTION INTRAVENOUS; SUBCUTANEOUS
Qty: 0 | Refills: 0 | DISCHARGE
Start: 2020-08-03

## 2020-08-03 RX ORDER — IBUPROFEN 200 MG
1 TABLET ORAL
Qty: 0 | Refills: 0 | DISCHARGE
Start: 2020-08-03

## 2020-08-03 RX ORDER — ACETAMINOPHEN 500 MG
2 TABLET ORAL
Qty: 0 | Refills: 0 | DISCHARGE
Start: 2020-08-03

## 2020-08-03 RX ORDER — ONDANSETRON 8 MG/1
40 TABLET, FILM COATED ORAL ONCE
Refills: 0 | Status: DISCONTINUED | OUTPATIENT
Start: 2020-08-03 | End: 2020-08-03

## 2020-08-03 RX ORDER — SENNA PLUS 8.6 MG/1
2 TABLET ORAL
Qty: 0 | Refills: 0 | DISCHARGE
Start: 2020-08-03

## 2020-08-03 RX ORDER — LACTULOSE 10 G/15ML
15 SOLUTION ORAL ONCE
Refills: 0 | Status: COMPLETED | OUTPATIENT
Start: 2020-08-03 | End: 2020-08-03

## 2020-08-03 RX ORDER — OXYCODONE HYDROCHLORIDE 5 MG/1
1 TABLET ORAL
Qty: 0 | Refills: 0 | DISCHARGE
Start: 2020-08-03

## 2020-08-03 RX ORDER — POLYETHYLENE GLYCOL 3350 17 G/17G
17 POWDER, FOR SOLUTION ORAL
Qty: 0 | Refills: 0 | DISCHARGE
Start: 2020-08-03

## 2020-08-03 RX ADMIN — Medication 400 MILLIGRAM(S): at 05:09

## 2020-08-03 RX ADMIN — Medication 1 MILLIGRAM(S): at 11:32

## 2020-08-03 RX ADMIN — PANTOPRAZOLE SODIUM 40 MILLIGRAM(S): 20 TABLET, DELAYED RELEASE ORAL at 05:10

## 2020-08-03 RX ADMIN — Medication 400 MILLIGRAM(S): at 23:18

## 2020-08-03 RX ADMIN — Medication 650 MILLIGRAM(S): at 05:09

## 2020-08-03 RX ADMIN — SIMVASTATIN 20 MILLIGRAM(S): 20 TABLET, FILM COATED ORAL at 21:13

## 2020-08-03 RX ADMIN — Medication 2: at 07:41

## 2020-08-03 RX ADMIN — OXYCODONE HYDROCHLORIDE 5 MILLIGRAM(S): 5 TABLET ORAL at 16:05

## 2020-08-03 RX ADMIN — Medication 400 MILLIGRAM(S): at 11:31

## 2020-08-03 RX ADMIN — ONDANSETRON 4 MILLIGRAM(S): 8 TABLET, FILM COATED ORAL at 17:23

## 2020-08-03 RX ADMIN — Medication 650 MILLIGRAM(S): at 17:23

## 2020-08-03 RX ADMIN — HEPARIN SODIUM 5000 UNIT(S): 5000 INJECTION INTRAVENOUS; SUBCUTANEOUS at 05:09

## 2020-08-03 RX ADMIN — POLYETHYLENE GLYCOL 3350 17 GRAM(S): 17 POWDER, FOR SOLUTION ORAL at 11:32

## 2020-08-03 RX ADMIN — HEPARIN SODIUM 5000 UNIT(S): 5000 INJECTION INTRAVENOUS; SUBCUTANEOUS at 21:13

## 2020-08-03 RX ADMIN — HEPARIN SODIUM 5000 UNIT(S): 5000 INJECTION INTRAVENOUS; SUBCUTANEOUS at 13:12

## 2020-08-03 RX ADMIN — LOSARTAN POTASSIUM 25 MILLIGRAM(S): 100 TABLET, FILM COATED ORAL at 05:09

## 2020-08-03 RX ADMIN — LACTULOSE 15 GRAM(S): 10 SOLUTION ORAL at 17:23

## 2020-08-03 RX ADMIN — Medication 650 MILLIGRAM(S): at 11:31

## 2020-08-03 RX ADMIN — SENNA PLUS 2 TABLET(S): 8.6 TABLET ORAL at 21:13

## 2020-08-03 RX ADMIN — Medication 10 MILLIGRAM(S): at 21:30

## 2020-08-03 RX ADMIN — Medication 4: at 11:32

## 2020-08-03 RX ADMIN — Medication 400 MILLIGRAM(S): at 17:23

## 2020-08-03 RX ADMIN — Medication 650 MILLIGRAM(S): at 23:18

## 2020-08-03 RX ADMIN — OXYCODONE HYDROCHLORIDE 5 MILLIGRAM(S): 5 TABLET ORAL at 10:01

## 2020-08-03 NOTE — OCCUPATIONAL THERAPY INITIAL EVALUATION ADULT - TRANSFER SAFETY CONCERNS NOTED: BED/CHAIR, REHAB EVAL
decreased sequencing ability/decreased weight-shifting ability/losing balance/decreased balance during turns/decreased step length/stand pivot

## 2020-08-03 NOTE — OCCUPATIONAL THERAPY INITIAL EVALUATION ADULT - TRANSFER SAFETY CONCERNS NOTED: TOILET, REHAB EVAL
decreased balance during turns/losing balance/decreased sequencing ability/decreased weight-shifting ability/stand pivot/decreased step length

## 2020-08-03 NOTE — DISCHARGE NOTE PROVIDER - HOSPITAL COURSE
7/31:     89y Female with PMH of hard of hearing, Diabetes mellitus, Hypertension, TIAx1, came to the ED s/p Fall. Pt was walking down the stairs and after reaching the bottom step she lost her balance. Pt fell on her left side and complains of head, lower chest, wrist and leg pain. +HT, -LOC, -AC, GCS 15. Pt was not able to get up but was helped up to the chair when help arrived and then brought to the ED. Pt denies chest pain, sob, abd pain, nausea, vomiting, dizziness. Pt is unable to ambulate and ROM is restricted due to pain.        worked up in ED:     	< from: CT Chest w/ IV Cont (07.31.20 @ 19:32) >    	IMPRESSION:    	No evidence of intra-thoracic, abdominal or pelvic visceral laceration or hematoma.    	There is deformity of the pubic symphysis which may related to old trauma and/or degenerative change. There is also a fracture of the left inferior pubic ramus, of indeterminate age, which may be acute.    	< end of copied text >        	< from: CT Cervical Spine No Cont (07.31.20 @ 19:33) >    	IMPRESSION:    	No CT evidence of acute fracture or significant subluxation of the cervical spine.    	Trace anterolisthesis of C2 on C3.    	Multilevel degenerative changes.    	< end of copied text >        	< from: CT Abdomen and Pelvis w/ IV Cont (07.31.20 @ 19:33) >    	IMPRESSION:    	No evidence of intra-thoracic, abdominal or pelvic visceral laceration or hematoma.        	There is deformity of the pubic symphysis which may related to old trauma and/or degenerative change. There is also a fracture of the left inferior pubic ramus, of indeterminate age, which may be acute.    	< end of copied text >        	< from: CT Maxillofacial No Cont (07.31.20 @ 20:24) >    	IMPRESSION:    	Focal soft tissue edema overlying the left mandibular ramus without encapsulated hematoma, drainable fluid collection, or underlying bony fracture/dislocation.    	< end of copied text >        seen by orthopedics who said :    89y Female with left LC1 pelvis fracture    - no acute orthopedic intervention, WBAT    Pt also had wrist pain and x-ray was done and negative.    Pt admitted to floor while awaiting PT/Rehab evaluation and discharge placement.         8/1:    seen by physiatry on 8/1 who recommended STR vs home with VNS.    pt was having episodes of dyspepsia and chest pain with an elevated /92. Given 10 of Labetalol IV push. STAT ekg was done and was NSR, stat trops and labs were drawn and trop was <0.01, cr bumped up to 1.5 from 1.0 the day before so she was restarted on fluids. BP went down to 135/69        8/2:    Seen by PT: recommended rehab facility for 2 weeks, commode and RW     Labs WNL fluids d/c'd        8/3:    Cr 1.0     Troponin repeated again <0.01    EKG repeated NSR     Accepted by [facility] (inpatient rehab)    Vital signs WNL    Patient discharged in stable condition with home meds restarted     tolerating RD without N/V, ambulating, voiding.

## 2020-08-03 NOTE — DISCHARGE NOTE PROVIDER - NSDCCPCAREPLAN_GEN_ALL_CORE_FT
PRINCIPAL DISCHARGE DIAGNOSIS  Diagnosis: Pubic ramus fracture  Assessment and Plan of Treatment: Weight bearing as tolerated  Ty;enol and motrin for pain, oxycodone for severe pain--continue bowel regimen  pt going to  for intense inpatient based rehab.  Please call the number provided with any questions or concerns such as worsening fever or new/different pain.   Please follow up in the trauma clinic in 2 weeks      SECONDARY DISCHARGE DIAGNOSES  Diagnosis: Fall  Assessment and Plan of Treatment: see above

## 2020-08-03 NOTE — DISCHARGE NOTE PROVIDER - NSFOLLOWUPCLINICS_GEN_ALL_ED_FT
[facility]  Trauma Surgery  [address] [place] [address]  Phone: [phone]  Fax:   Follow Up Time: 2 weeks

## 2020-08-03 NOTE — DISCHARGE NOTE PROVIDER - NSDCMRMEDTOKEN_GEN_ALL_CORE_FT
acetaminophen 325 mg oral tablet: 2 tab(s) orally every 6 hours  folic acid 1 mg oral tablet: 1 tab(s) orally once a day  heparin: 5000 unit(s) intramuscularly every 8 hours  ibuprofen 400 mg oral tablet: 1 tab(s) orally every 6 hours  losartan 25 mg oral tablet: 1 tab(s) orally once a day  metFORMIN 500 mg oral tablet:   omeprazole 20 mg oral delayed release capsule: 1 cap(s) orally once a day  oxyCODONE 5 mg oral tablet: 1 tab(s) orally every 6 hours, As needed, Severe Pain (7 - 10)  polyethylene glycol 3350 oral powder for reconstitution: 17 gram(s) orally once a day  senna oral tablet: 2 tab(s) orally once a day (at bedtime)  simvastatin 20 mg oral tablet: 1 tab(s) orally once a day (at bedtime)

## 2020-08-03 NOTE — OCCUPATIONAL THERAPY INITIAL EVALUATION ADULT - PERTINENT HX OF CURRENT PROBLEM, REHAB EVAL
89y Female presented to ED s/p mechanical fall; lost balance on bottem step. PMH of hard of hearing, Diabetes mellitus, Hypertension, TIAx1. Observed deformity of pubic symphysis and fracture of left inferior pubic ramus of old trauma/ degenerative changes.

## 2020-08-03 NOTE — PROGRESS NOTE ADULT - ASSESSMENT
A/P:  PHILIP CASTILLO is a 89yFemale w pubic rami fx L    Plan:   - EKG repeat due to atypical chest pain and cardiac enzymes ordered stat will fu  - OOB AT  - IS  - f/u vitals  - f/u labs & replete if necessary  - DVT PPX  - GI PPX  - Speak w/ SW/CM  - Begin dispo planning for D/C  - patient was explained plan, understood, and agreed

## 2020-08-03 NOTE — OCCUPATIONAL THERAPY INITIAL EVALUATION ADULT - GENERAL OBSERVATIONS, REHAB EVAL
Pt received supine in bed. + Primafit, + IV lock, + R LE sequential. Tx: [id number]. Pt chart thoroughly reviewed prior to OT evaluation

## 2020-08-03 NOTE — PROGRESS NOTE ADULT - SUBJECTIVE AND OBJECTIVE BOX
GENERAL SURGERY PROGRESS NOTE     PHILIP CASTILLO  [address] [place] [address] day :4d        24H events/hpi:  Patient seen & examined at bedside. Tolerating diet, +voiding.  dyspepsia improving     saturday night pt with chest pain and BP to 198/90, was given push of labetalol 10mg, stat EKG ordered and STAT labs and cardiac enzymes drawn. ekg was wnl trop <0.01  will repeat today due ongoing chest pain.       T(F): 97.7 (08-03-20 @ 05:18), Max: 97.7 (08-03-20 @ 05:18)  HR: 89 (08-03-20 @ 05:18) (73 - 89)  BP: 142/79 (08-03-20 @ 05:18) (122/59 - 153/68)  ABP: --  ABP(mean): --  RR: 18 (08-03-20 @ 05:18) (18 - 18)  SpO2: --      08-02-20 @ 07:01  -  08-03-20 @ 07:00  --------------------------------------------------------  IN:    Oral Fluid: 360 mL  Total IN: 360 mL    OUT:    Voided: 1000 mL  Total OUT: 1000 mL    Total NET: -640 mL      08-03-20 @ 07:01  -  08-03-20 @ 08:01  --------------------------------------------------------  IN:    Oral Fluid: 240 mL  Total IN: 240 mL    OUT:  Total OUT: 0 mL    Total NET: 240 mL        DIET/FLUIDS: dextrose 5%. 1000 milliLiter(s) IV Continuous <Continuous>  folic acid 1 milliGRAM(s) Oral daily      I&O    08-02-20 @ 07:01  -  08-03-20 @ 07:00  --------------------------------------------------------  IN: 360 mL / OUT: 1000 mL / NET: -640 mL    08-03-20 @ 07:01  -  08-03-20 @ 08:01  --------------------------------------------------------  IN: 240 mL / OUT: 0 mL / NET: 240 mL        URINE:      GI proph:  pantoprazole    Tablet 40 milliGRAM(s) Oral before breakfast    AC/ proph: heparin SubCutaneous Injection - Peds 5000 Unit(s) SubCutaneous every 8 hours    ABx:     PHYSICAL EXAM:  GENERAL: mild distress, dyspepsia and c/o mild chest pain, ecchymoses on left preauricular area  CHEST/LUNG: Clear to auscultation bilaterally  HEART: Regular rate and rhythm  ABDOMEN: Soft, Nontender, Nondistended;   EXTREMITIES:  No clubbing, cyanosis, or edema    LABS  Labs:  CAPILLARY BLOOD GLUCOSE      POCT Blood Glucose.: 159 mg/dL (03 Aug 2020 07:28)  POCT Blood Glucose.: 162 mg/dL (02 Aug 2020 20:40)  POCT Blood Glucose.: 191 mg/dL (02 Aug 2020 16:23)  POCT Blood Glucose.: 158 mg/dL (02 Aug 2020 11:01)                          11.6   9.86  )-----------( 160      ( 02 Aug 2020 01:35 )             34.3         08-02    134<L>  |  98  |  13  ----------------------------<  157<H>  4.1   |  25  |  1.0          LFTs:     Lactate, Blood: 1.5 mmol/L (07-31-20 @ 18:00)      Coags:    CARDIAC MARKERS ( 02 Aug 2020 01:55 )  x     / <0.01 ng/mL / x     / x     / 3.2 ng/mL  CARDIAC MARKERS ( 02 Aug 2020 01:35 )  x     / x     / 160 U/L / x     / x

## 2020-08-03 NOTE — DISCHARGE NOTE NURSING/CASE MANAGEMENT/SOCIAL WORK - PATIENT PORTAL LINK FT
You can access the FollowMyHealth Patient Portal offered by [facility] by registering at the following website: [email or web address]. By joining [employer]’s FollowMyHealth portal, you will also be able to view your health information using other applications (apps) compatible with our system.

## 2020-08-04 ENCOUNTER — INPATIENT (INPATIENT)
Facility: HOSPITAL | Age: 85
LOS: 9 days | Discharge: HOME HEALTH | End: 2020-08-14
Attending: PHYSICAL MEDICINE & REHABILITATION | Admitting: PHYSICAL MEDICINE & REHABILITATION
Payer: MEDICARE

## 2020-08-04 VITALS
SYSTOLIC BLOOD PRESSURE: 122 MMHG | TEMPERATURE: 97 F | OXYGEN SATURATION: 96 % | DIASTOLIC BLOOD PRESSURE: 57 MMHG | HEART RATE: 77 BPM | RESPIRATION RATE: 18 BRPM

## 2020-08-04 VITALS
TEMPERATURE: 97 F | RESPIRATION RATE: 20 BRPM | HEART RATE: 95 BPM | SYSTOLIC BLOOD PRESSURE: 144 MMHG | DIASTOLIC BLOOD PRESSURE: 65 MMHG

## 2020-08-04 PROBLEM — G45.9 TRANSIENT CEREBRAL ISCHEMIC ATTACK, UNSPECIFIED: Chronic | Status: ACTIVE | Noted: 2020-07-31

## 2020-08-04 LAB
APPEARANCE UR: CLEAR — SIGNIFICANT CHANGE UP
BILIRUB UR-MCNC: NEGATIVE — SIGNIFICANT CHANGE UP
COLOR SPEC: SIGNIFICANT CHANGE UP
DIFF PNL FLD: NEGATIVE — SIGNIFICANT CHANGE UP
GLUCOSE BLDC GLUCOMTR-MCNC: 137 MG/DL — HIGH (ref 70–99)
GLUCOSE BLDC GLUCOMTR-MCNC: 147 MG/DL — HIGH (ref 70–99)
GLUCOSE BLDC GLUCOMTR-MCNC: 214 MG/DL — HIGH (ref 70–99)
GLUCOSE UR QL: NEGATIVE — SIGNIFICANT CHANGE UP
KETONES UR-MCNC: NEGATIVE — SIGNIFICANT CHANGE UP
LEUKOCYTE ESTERASE UR-ACNC: NEGATIVE — SIGNIFICANT CHANGE UP
NITRITE UR-MCNC: NEGATIVE — SIGNIFICANT CHANGE UP
PH UR: 7 — SIGNIFICANT CHANGE UP (ref 5–8)
PROT UR-MCNC: SIGNIFICANT CHANGE UP
SP GR SPEC: 1.01 — SIGNIFICANT CHANGE UP (ref 1.01–1.02)
UROBILINOGEN FLD QL: SIGNIFICANT CHANGE UP

## 2020-08-04 PROCEDURE — 93010 ELECTROCARDIOGRAM REPORT: CPT

## 2020-08-04 PROCEDURE — 99232 SBSQ HOSP IP/OBS MODERATE 35: CPT

## 2020-08-04 RX ORDER — METFORMIN HYDROCHLORIDE 850 MG/1
500 TABLET ORAL DAILY
Refills: 0 | Status: DISCONTINUED | OUTPATIENT
Start: 2020-08-04 | End: 2020-08-04

## 2020-08-04 RX ORDER — INSULIN LISPRO 100/ML
VIAL (ML) SUBCUTANEOUS
Refills: 0 | Status: DISCONTINUED | OUTPATIENT
Start: 2020-08-04 | End: 2020-08-07

## 2020-08-04 RX ORDER — DEXTROSE 50 % IN WATER 50 %
12.5 SYRINGE (ML) INTRAVENOUS ONCE
Refills: 0 | Status: DISCONTINUED | OUTPATIENT
Start: 2020-08-04 | End: 2020-08-14

## 2020-08-04 RX ORDER — ACETAMINOPHEN 500 MG
650 TABLET ORAL EVERY 6 HOURS
Refills: 0 | Status: DISCONTINUED | OUTPATIENT
Start: 2020-08-04 | End: 2020-08-14

## 2020-08-04 RX ORDER — DEXTROSE 50 % IN WATER 50 %
25 SYRINGE (ML) INTRAVENOUS ONCE
Refills: 0 | Status: DISCONTINUED | OUTPATIENT
Start: 2020-08-04 | End: 2020-08-14

## 2020-08-04 RX ORDER — LOSARTAN POTASSIUM 100 MG/1
25 TABLET, FILM COATED ORAL DAILY
Refills: 0 | Status: DISCONTINUED | OUTPATIENT
Start: 2020-08-04 | End: 2020-08-14

## 2020-08-04 RX ORDER — POLYETHYLENE GLYCOL 3350 17 G/17G
17 POWDER, FOR SOLUTION ORAL DAILY
Refills: 0 | Status: DISCONTINUED | OUTPATIENT
Start: 2020-08-04 | End: 2020-08-14

## 2020-08-04 RX ORDER — SODIUM CHLORIDE 9 MG/ML
1000 INJECTION, SOLUTION INTRAVENOUS
Refills: 0 | Status: DISCONTINUED | OUTPATIENT
Start: 2020-08-04 | End: 2020-08-14

## 2020-08-04 RX ORDER — SENNA PLUS 8.6 MG/1
2 TABLET ORAL AT BEDTIME
Refills: 0 | Status: DISCONTINUED | OUTPATIENT
Start: 2020-08-04 | End: 2020-08-14

## 2020-08-04 RX ORDER — HEPARIN SODIUM 5000 [USP'U]/ML
5000 INJECTION INTRAVENOUS; SUBCUTANEOUS EVERY 8 HOURS
Refills: 0 | Status: DISCONTINUED | OUTPATIENT
Start: 2020-08-04 | End: 2020-08-04

## 2020-08-04 RX ORDER — METFORMIN HYDROCHLORIDE 850 MG/1
500 TABLET ORAL
Refills: 0 | Status: DISCONTINUED | OUTPATIENT
Start: 2020-08-04 | End: 2020-08-04

## 2020-08-04 RX ORDER — FOLIC ACID 0.8 MG
1 TABLET ORAL DAILY
Refills: 0 | Status: DISCONTINUED | OUTPATIENT
Start: 2020-08-04 | End: 2020-08-14

## 2020-08-04 RX ORDER — GLUCAGON INJECTION, SOLUTION 0.5 MG/.1ML
1 INJECTION, SOLUTION SUBCUTANEOUS ONCE
Refills: 0 | Status: DISCONTINUED | OUTPATIENT
Start: 2020-08-04 | End: 2020-08-14

## 2020-08-04 RX ORDER — ENOXAPARIN SODIUM 100 MG/ML
40 INJECTION SUBCUTANEOUS DAILY
Refills: 0 | Status: DISCONTINUED | OUTPATIENT
Start: 2020-08-04 | End: 2020-08-14

## 2020-08-04 RX ORDER — SIMVASTATIN 20 MG/1
20 TABLET, FILM COATED ORAL AT BEDTIME
Refills: 0 | Status: DISCONTINUED | OUTPATIENT
Start: 2020-08-04 | End: 2020-08-14

## 2020-08-04 RX ORDER — OXYCODONE HYDROCHLORIDE 5 MG/1
5 TABLET ORAL EVERY 6 HOURS
Refills: 0 | Status: DISCONTINUED | OUTPATIENT
Start: 2020-08-04 | End: 2020-08-09

## 2020-08-04 RX ORDER — SIMVASTATIN 20 MG/1
1 TABLET, FILM COATED ORAL
Qty: 0 | Refills: 0 | DISCHARGE

## 2020-08-04 RX ORDER — PANTOPRAZOLE SODIUM 20 MG/1
40 TABLET, DELAYED RELEASE ORAL
Refills: 0 | Status: DISCONTINUED | OUTPATIENT
Start: 2020-08-04 | End: 2020-08-14

## 2020-08-04 RX ORDER — DEXTROSE 50 % IN WATER 50 %
15 SYRINGE (ML) INTRAVENOUS ONCE
Refills: 0 | Status: DISCONTINUED | OUTPATIENT
Start: 2020-08-04 | End: 2020-08-14

## 2020-08-04 RX ORDER — IBUPROFEN 200 MG
400 TABLET ORAL EVERY 6 HOURS
Refills: 0 | Status: DISCONTINUED | OUTPATIENT
Start: 2020-08-04 | End: 2020-08-10

## 2020-08-04 RX ORDER — IBUPROFEN 200 MG
400 TABLET ORAL EVERY 6 HOURS
Refills: 0 | Status: DISCONTINUED | OUTPATIENT
Start: 2020-08-04 | End: 2020-08-04

## 2020-08-04 RX ADMIN — Medication 30 MILLILITER(S): at 16:31

## 2020-08-04 RX ADMIN — SIMVASTATIN 20 MILLIGRAM(S): 20 TABLET, FILM COATED ORAL at 21:11

## 2020-08-04 RX ADMIN — Medication 650 MILLIGRAM(S): at 05:16

## 2020-08-04 RX ADMIN — Medication 650 MILLIGRAM(S): at 10:54

## 2020-08-04 RX ADMIN — Medication 400 MILLIGRAM(S): at 10:55

## 2020-08-04 RX ADMIN — Medication 650 MILLIGRAM(S): at 17:57

## 2020-08-04 RX ADMIN — Medication 650 MILLIGRAM(S): at 00:20

## 2020-08-04 RX ADMIN — POLYETHYLENE GLYCOL 3350 17 GRAM(S): 17 POWDER, FOR SOLUTION ORAL at 10:57

## 2020-08-04 RX ADMIN — Medication 400 MILLIGRAM(S): at 00:25

## 2020-08-04 RX ADMIN — Medication 400 MILLIGRAM(S): at 05:16

## 2020-08-04 RX ADMIN — SENNA PLUS 2 TABLET(S): 8.6 TABLET ORAL at 21:11

## 2020-08-04 RX ADMIN — Medication 1 MILLIGRAM(S): at 10:53

## 2020-08-04 RX ADMIN — PANTOPRAZOLE SODIUM 40 MILLIGRAM(S): 20 TABLET, DELAYED RELEASE ORAL at 06:02

## 2020-08-04 RX ADMIN — LOSARTAN POTASSIUM 25 MILLIGRAM(S): 100 TABLET, FILM COATED ORAL at 05:16

## 2020-08-04 RX ADMIN — HEPARIN SODIUM 5000 UNIT(S): 5000 INJECTION INTRAVENOUS; SUBCUTANEOUS at 05:17

## 2020-08-04 RX ADMIN — Medication 4: at 11:38

## 2020-08-04 RX ADMIN — OXYCODONE HYDROCHLORIDE 5 MILLIGRAM(S): 5 TABLET ORAL at 08:13

## 2020-08-04 NOTE — H&P ADULT - HISTORY OF PRESENT ILLNESS
HPI:  89y Female with PMH of hard of hearing, Diabetes mellitus, Hypertension, TIAx1, came to the ED s/p Fall. Pt was walking down the stairs and after reaching the bottom step she lost her balance. Pt fell on her left side and complains of head, lower chest, wrist and leg pain. +HT, -LOC, -AC, GCS 15. Pt was not able to get up but was helped up to the chair when help arrived and then brought to the ED. Pt denies chest pain, sob, abd pain, nausea, vomiting, dizziness. Pt is unable to ambulate and ROM is restricted due to pain.  was DX w pelvic FX. pt was evaluated by physiatrist and deemed a good acute rehab candidate can tolerate and benefit from 3hrs of rehab to improve function for safe DC home    PHYSICAL EXAMINATION = AVSS slight elevated BP  as per daughter  VItals: T(C): 36.1 (08-04-20 @ 13:01), Max: 36.1 (08-03-20 @ 20:25)  HR: 95 (08-04-20 @ 13:01) (82 - 95)  BP: 144/65 (08-04-20 @ 13:01) (144/65 - 157/72)  RR: 20 (08-04-20 @ 13:01) (18 - 20)  SpO2: --on room air      General: NAD, Resting Comfortable,   in bed daughter at bedside                               HEENT: NC/AT, EOM I, PERRLA, Normal Conjunctivae, ecchymosis left side of neck   Cardio: RRR, Normal S1-S2, No M/G/R                              Pulm: No Respiratory Distress,  Lungs CTAB                        Abdomen: ND/NT, Soft, BS+                                                MSK: No joint swelling, Full ROM  , pain on left rib cage                                       Ext: No C/C/E, Pulses 2+ throughout, No calf tenderness    Skin:  all skin intact                                                                 Wounds: none  Decubitus Ulcers: None Present     Neurological Examination    Cognitive: AAO x 3                                                                         Attention: Intact   Judgment: Good evidence of judgement                                   Mood/Affect: wnl                                                                           Communication:  Fluent,  No dysarthria   Swallow: intact  CN II - XII  intact  Coordination: FTN/HTS intact                                                                              Sensory: Intact to light touch, PP and Vibration                                                                                             Tone: normal Throughout   Balance: impaired    Motor    LEFT    UE: 4/5  RIGHT UE: 4/5  LEFT    LE:  4-/5  RIGHT LE: 4/5    Reflex:  2 + throughout,

## 2020-08-04 NOTE — H&P ADULT - ASSESSMENT
ASSESSMENT/PLAN    89 yr old [place] female left pelvic pubic rami fx post fall    Comorbidities MEDICAL ISSUES:  anemia =folic acid , iron  DM2 metformin   check B12  HTN losartan  HLD zocor  [ PROBLEM LIST]     -Pain control: Tylenol PRN, oxycodone    -GI/Bowel Mgmt -  miralax, protonix    -Bladder management: voids well    - DVT: Lovenox, TEDs     #Skin:  -No active issues at this time    FEN   - Diet -    - Dysphagia       [place] =speak louder as needed      Precautions / PROPHYLAXIS:      - Falls, Cardiac    - Ortho: Weight bearing status: wbat          MEDICAL PROGNOSIS: GOOD            REHAB POTENTIAL: GOOD             ESTIMATED DISPOSITION: HOME WITH HOME CARE              ELOS:  [    ]  7-14 Days      [x   ] 14 - 21 Days    [    ]  Other    THERAPY ORDERS and INITIAL INDIVIDUALIZED PLAN OF CARE:  This initial individualized interdisciplinary plan of care, which was established by me (the attending physiatrist), is based on elements from the post admission evaluation. The interdisciplinary therapy program is to be at least 3 hrs a day, at least 5 days per week from  physical, occupational and/ or speech therapies as ordered by me below.      [ x  ] P.T. 90 mins /day at least 5 out of 7 days:  [  x ] superficial  modalities prn, [ x  ] A/AAROM, [ x  ] PREs, [ x  ] transfer training,            [ x  ] progressive ambulation, [x   ] stairs                                               [ x  ] O.T. 90 mins. /day at least 5 out of 7 days::  [ x  ] modalities prn, [ x  ]A/AAROM, [ x  ] PREs, functional transfer training, [ x  ] ADLs,              [   ]cognitive/ perceptual eval and training, [   ] splint eval                                                  [   ] S.L.P:  [   ] speech eval, [   ] swallow eval     [   ] Neuropsychology      [ x  ] Individualized rec. therapy    PRESCREEN COMPARISON   I have reviewed the prescreen information and I have found no relevant changes between the preadmission screening and my post admission evaluation     RATIONALE FOR INPATIENT ADMISSION - Patient demonstrates the following: (check all that apply)  [X] Medically appropriate for rehabilitation admission  [X] Has attainable rehab goals with an appropriate initial discharge plan  [X] Has rehabilitation potential (expected to make a significant improvement within a reasonable period of time)  [X] Requires close medical management by a rehab physician, rehab nursing care,  and comprehensive interdisciplinary team (including PT, OT)

## 2020-08-04 NOTE — PROGRESS NOTE ADULT - ASSESSMENT
Assessment:  89y Female with left pubic rami fracture. No events overnight.     Plan:  - discharge to  today

## 2020-08-04 NOTE — H&P ADULT - NSICDXPASTMEDICALHX_GEN_ALL_CORE_FT
PAST MEDICAL HISTORY:  Diabetes mellitus     [place] (hard of hearing)     Hypertension     TIA (transient ischemic attack) Over ten years ago; was on plavix now DC

## 2020-08-04 NOTE — PROGRESS NOTE ADULT - SUBJECTIVE AND OBJECTIVE BOX
GENERAL SURGERY PROGRESS NOTE     PHILIP CASTILLO  89y  Female  Hospital day :4d    OVERNIGHT EVENTS: no acute events overnight     T(F): 96.9 (08-03-20 @ 20:25), Max: 97.7 (08-03-20 @ 05:18)  HR: 88 (08-03-20 @ 20:25) (71 - 89)  BP: 145/93 (08-03-20 @ 20:25) (116/56 - 145/93)  RR: 18 (08-03-20 @ 20:25) (18 - 20)    DIET/FLUIDS: dextrose 5%. 1000 milliLiter(s) IV Continuous <Continuous>  folic acid 1 milliGRAM(s) Oral daily     GI proph:  pantoprazole    Tablet 40 milliGRAM(s) Oral before breakfast    AC/ proph: heparin SubCutaneous Injection - Peds 5000 Unit(s) SubCutaneous every 8 hours    PHYSICAL EXAM:  GENERAL: NAD, well-appearing  ABDOMEN: Soft, Nontender, Nondistended;       LABS  CAPILLARY BLOOD GLUCOSE      POCT Blood Glucose.: 130 mg/dL (03 Aug 2020 20:25)  POCT Blood Glucose.: 134 mg/dL (03 Aug 2020 16:31)  POCT Blood Glucose.: 218 mg/dL (03 Aug 2020 11:06)  POCT Blood Glucose.: 159 mg/dL (03 Aug 2020 07:28)    Coags:    CARDIAC MARKERS ( 03 Aug 2020 08:35 )  x     / <0.01 ng/mL / x     / x     / 2.6 ng/mL

## 2020-08-04 NOTE — H&P ADULT - NSHPREVIEWOFSYSTEMS_GEN_ALL_CORE
REVIEW OF SYSTEMS  Constitutional: No fever, No Chills, No fatigue  HEENT: No eye pain, No visual disturbances, + difficulty hearing  Pulm: No cough,  No shortness of breath  Cardio: No chest pain, No palpitations  GI:  No abdominal pain, No nausea, No vomiting, No diarrhea, No constipation  : No dysuria, No frequency, No hematuria  Neuro: No headaches, No memory loss, No loss of strength, No numbness, No tremors  Skin: No itching, No rashes, No lesions   Endo: No temperature intolerance  MSK: left pelvic joint pain, No joint swelling, No muscle pain, No Neck or back pain  Psych:  No depression, No anxiety

## 2020-08-04 NOTE — H&P ADULT - NSHPSOCIALHISTORY_GEN_ALL_CORE
lives w daughter who is an RN, family stays w pt at all times. IND w/o device at home. few steps to enter

## 2020-08-04 NOTE — H&P ADULT - NSHPLABSRESULTS_GEN_ALL_CORE
POCT Blood Glucose.: 126 mg/dL (01 Aug 2020 21:38)  POCT Blood Glucose.: 184 mg/dL (01 Aug 2020 17:08)  POCT Blood Glucose.: 253 mg/dL (01 Aug 2020 11:27)  POCT Blood Glucose.: 145 mg/dL (01 Aug 2020 08:36)                          10.3   8.40  )-----------( 153      ( 01 Aug 2020 22:12 )             30.0       Auto Neutrophil %: 70.3 % (20 @ 22:12)  Auto Immature Granulocyte %: 1.0 % (20 @ 22:12)        135  |  101  |  14  ----------------------------<  127<H>  3.4<L>   |  24  |  1.0      Calcium, Total Serum: 8.5 mg/dL (20 @ 22:12)      LFTs:             7.2  | 0.3  | 27       ------------------[73      ( 2020 18:00 )    4.6  | x    | 26        Coags:     11.60  ----< 1.01    ( 2020 18:00 )     30.5                Urinalysis Basic - ( 2020 21:45 )    Color: Colorless / Appearance: Clear / S.026 / pH: x  Gluc: x / Ketone: Negative  / Bili: Negative / Urobili: <2 mg/dL   Blood: x / Protein: Trace / Nitrite: Negative   Leuk Esterase: Negative / RBC: x / WBC x   Sq Epi: x / Non Sq Epi: x / Bacteria: x

## 2020-08-04 NOTE — PROGRESS NOTE ADULT - ATTENDING COMMENTS
I examined the patient with the pa and discussed my plan with the resident  the patient has pain associated with pubic ramus fracture and will need pain control and to work with pt
doing well   WBAT   Transfer to 4A
pubic rami fracture   ANA M better controlled   rehab eval   discharge planning
s/p fall   pubic rami fracture  PT evaluation   discharge planning

## 2020-08-05 LAB
ALBUMIN SERPL ELPH-MCNC: 4.1 G/DL — SIGNIFICANT CHANGE UP (ref 3.5–5.2)
ALP SERPL-CCNC: 53 U/L — SIGNIFICANT CHANGE UP (ref 30–115)
ALT FLD-CCNC: 23 U/L — SIGNIFICANT CHANGE UP (ref 0–41)
ANION GAP SERPL CALC-SCNC: 11 MMOL/L — SIGNIFICANT CHANGE UP (ref 7–14)
AST SERPL-CCNC: 31 U/L — SIGNIFICANT CHANGE UP (ref 0–41)
BILIRUB SERPL-MCNC: 0.6 MG/DL — SIGNIFICANT CHANGE UP (ref 0.2–1.2)
BUN SERPL-MCNC: 18 MG/DL — SIGNIFICANT CHANGE UP (ref 10–20)
CALCIUM SERPL-MCNC: 9.3 MG/DL — SIGNIFICANT CHANGE UP (ref 8.5–10.1)
CHLORIDE SERPL-SCNC: 98 MMOL/L — SIGNIFICANT CHANGE UP (ref 98–110)
CO2 SERPL-SCNC: 26 MMOL/L — SIGNIFICANT CHANGE UP (ref 17–32)
CREAT SERPL-MCNC: 1.1 MG/DL — SIGNIFICANT CHANGE UP (ref 0.7–1.5)
GLUCOSE BLDC GLUCOMTR-MCNC: 138 MG/DL — HIGH (ref 70–99)
GLUCOSE BLDC GLUCOMTR-MCNC: 153 MG/DL — HIGH (ref 70–99)
GLUCOSE BLDC GLUCOMTR-MCNC: 188 MG/DL — HIGH (ref 70–99)
GLUCOSE SERPL-MCNC: 169 MG/DL — HIGH (ref 70–99)
HCT VFR BLD CALC: 30.3 % — LOW (ref 37–47)
HGB BLD-MCNC: 10.3 G/DL — LOW (ref 12–16)
MAGNESIUM SERPL-MCNC: 2.2 MG/DL — SIGNIFICANT CHANGE UP (ref 1.8–2.4)
MCHC RBC-ENTMCNC: 34 G/DL — SIGNIFICANT CHANGE UP (ref 32–37)
MCHC RBC-ENTMCNC: 34 PG — HIGH (ref 27–31)
MCV RBC AUTO: 100 FL — HIGH (ref 81–99)
NRBC # BLD: 0 /100 WBCS — SIGNIFICANT CHANGE UP (ref 0–0)
PHOSPHATE SERPL-MCNC: 3 MG/DL — SIGNIFICANT CHANGE UP (ref 2.1–4.9)
PLATELET # BLD AUTO: 179 K/UL — SIGNIFICANT CHANGE UP (ref 130–400)
POTASSIUM SERPL-MCNC: 4.5 MMOL/L — SIGNIFICANT CHANGE UP (ref 3.5–5)
POTASSIUM SERPL-SCNC: 4.5 MMOL/L — SIGNIFICANT CHANGE UP (ref 3.5–5)
PROT SERPL-MCNC: 6.5 G/DL — SIGNIFICANT CHANGE UP (ref 6–8)
RBC # BLD: 3.03 M/UL — LOW (ref 4.2–5.4)
RBC # FLD: 12.5 % — SIGNIFICANT CHANGE UP (ref 11.5–14.5)
SODIUM SERPL-SCNC: 135 MMOL/L — SIGNIFICANT CHANGE UP (ref 135–146)
VIT B12 SERPL-MCNC: 244 PG/ML — SIGNIFICANT CHANGE UP (ref 232–1245)
WBC # BLD: 5.94 K/UL — SIGNIFICANT CHANGE UP (ref 4.8–10.8)
WBC # FLD AUTO: 5.94 K/UL — SIGNIFICANT CHANGE UP (ref 4.8–10.8)

## 2020-08-05 RX ORDER — ASPIRIN/CALCIUM CARB/MAGNESIUM 324 MG
81 TABLET ORAL DAILY
Refills: 0 | Status: DISCONTINUED | OUTPATIENT
Start: 2020-08-05 | End: 2020-08-14

## 2020-08-05 RX ADMIN — PANTOPRAZOLE SODIUM 40 MILLIGRAM(S): 20 TABLET, DELAYED RELEASE ORAL at 06:20

## 2020-08-05 RX ADMIN — Medication 650 MILLIGRAM(S): at 06:20

## 2020-08-05 RX ADMIN — POLYETHYLENE GLYCOL 3350 17 GRAM(S): 17 POWDER, FOR SOLUTION ORAL at 11:47

## 2020-08-05 RX ADMIN — Medication 650 MILLIGRAM(S): at 07:30

## 2020-08-05 RX ADMIN — Medication 650 MILLIGRAM(S): at 11:46

## 2020-08-05 RX ADMIN — Medication 81 MILLIGRAM(S): at 14:18

## 2020-08-05 RX ADMIN — Medication 650 MILLIGRAM(S): at 12:10

## 2020-08-05 RX ADMIN — Medication 400 MILLIGRAM(S): at 09:42

## 2020-08-05 RX ADMIN — Medication 400 MILLIGRAM(S): at 11:41

## 2020-08-05 RX ADMIN — Medication 650 MILLIGRAM(S): at 00:36

## 2020-08-05 RX ADMIN — SIMVASTATIN 20 MILLIGRAM(S): 20 TABLET, FILM COATED ORAL at 21:40

## 2020-08-05 RX ADMIN — Medication 1 MILLIGRAM(S): at 11:46

## 2020-08-05 RX ADMIN — SENNA PLUS 2 TABLET(S): 8.6 TABLET ORAL at 21:40

## 2020-08-05 RX ADMIN — Medication 1: at 16:43

## 2020-08-05 RX ADMIN — Medication 650 MILLIGRAM(S): at 17:20

## 2020-08-05 RX ADMIN — Medication 1: at 11:46

## 2020-08-05 RX ADMIN — LOSARTAN POTASSIUM 25 MILLIGRAM(S): 100 TABLET, FILM COATED ORAL at 06:20

## 2020-08-05 RX ADMIN — ENOXAPARIN SODIUM 40 MILLIGRAM(S): 100 INJECTION SUBCUTANEOUS at 11:47

## 2020-08-05 RX ADMIN — Medication 650 MILLIGRAM(S): at 17:21

## 2020-08-05 NOTE — PROGRESS NOTE ADULT - ASSESSMENT
89 yr old [place] female left pelvic pubic rami fx post fall    Comorbidities MEDICAL ISSUES:  anemia =folic acid , iron studies , hemoccult stools  DM2 metformin   check B12  HTN losartan  HLD TIA zocor, asa 81mg  [ PROBLEM LIST]     -Pain control: Tylenol , oxycodone PRN    -GI/Bowel Mgmt -  miralax, protonix, maalox or mylanta    -Bladder management: voids well    - DVT: Lovenox, TEDs     #Skin:  -No active issues at this time    FEN   Diet -  diabetic dash    [place] =speak louder as needed or pocket talker    osteoporosis by FX check vit D, add calcium  Precautions / PROPHYLAXIS:      - Falls, Cardiac    - Ortho: Weight bearing status: wbat

## 2020-08-05 NOTE — PROGRESS NOTE ADULT - SUBJECTIVE AND OBJECTIVE BOX
Patient is a 89y old  Female who presents with a chief complaint of rehab of left pelvic FX (04 Aug 2020 13:29)        HPI:  89y Female with PMH of hard of hearing, Diabetes mellitus 2, Hypertension, TIAx1, came to the ED s/p Fall. Pt was walking down the stairs and after reaching the bottom step she lost her balance. Pt fell on her left side and complains of head, lower chest, wrist and leg pain. +HT, -LOC, -AC, GCS 15. Pt was not able to get up but was helped up to the chair when help arrived and then brought to the ED. Pt denies chest pain, sob, abd pain, nausea, vomiting, dizziness. Pt is unable to ambulate and ROM is restricted due to pain.  was DX w pelvic FX. pt was evaluated by physiatrist and deemed a good acute rehab candidate can tolerate and benefit from 3hrs of rehab to improve function for safe DC home    PHYSICAL EXAMINATION =  Vital Signs Last 24 Hrs=AVSS  T(C): 36.4 (05 Aug 2020 05:16), Max: 36.4 (05 Aug 2020 05:16)  T(F): 97.6 (05 Aug 2020 05:16), Max: 97.6 (05 Aug 2020 05:16)  HR: 82 (05 Aug 2020 05:16) (77 - 95)  BP: 138/68 (05 Aug 2020 05:16) (122/57 - 165/75)  BP(mean): --  RR: 18 (05 Aug 2020 05:16) (18 - 20)  SpO2: 96% (04 Aug 2020 14:48) (96% - 96%)    General: NAD, Resting Comfortable    in bed                          HEENT: NC/AT, EOM I, PERRLA, Normal Conjunctiva ecchymosis left side of neck   Cardio: RRR, Normal S1-S2, No M/G/R                              Pulm: No Respiratory Distress,  Lungs CTAB                        Abdomen: ND/NT, Soft, BS+                                                MSK: No joint swelling, Full ROM  , pain on left rib cage                                       Ext: No C/C/E, Pulses 2+ throughout, No calf tenderness    Skin:  all skin intact                                                                 Wounds: none  Decubitus Ulcers: None Present     Neurological Examination    Cognitive: AAO x 3                                                                         Attention: Intact   Judgment: Good evidence of judgement                                   Mood/Affect: wnl                                                                           Communication:  Fluent,  No dysarthria   Swallow: intact  CN II - XII  intact  Coordination: FTN/HTS intact                                                                              Sensory: Intact to light touch, PP and Vibration                                                                                             Tone: normal Throughout   Balance: impaired    Motor    LEFT    UE: 4/5  RIGHT UE: 4/5  LEFT    LE:  4-/5  RIGHT LE: 4/5    Reflex:  2 + throughout, (04 Aug 2020 13:29)      I examined the patient and reviewed the chart. There have been no significant changes since my history and physical except where documented   TODAY'S SUBJECTIVE & REVIEW OF SYMPTOMS unchanged     meds  MEDICATIONS  (STANDING):  acetaminophen   Tablet .. 650 milliGRAM(s) Oral every 6 hours  dextrose 5%. 1000 milliLiter(s) (50 mL/Hr) IV Continuous <Continuous>  dextrose 50% Injectable 12.5 Gram(s) IV Push once  dextrose 50% Injectable 25 Gram(s) IV Push once  dextrose 50% Injectable 25 Gram(s) IV Push once  enoxaparin Injectable 40 milliGRAM(s) SubCutaneous daily  folic acid 1 milliGRAM(s) Oral daily  insulin lispro (HumaLOG) corrective regimen sliding scale   SubCutaneous three times a day before meals  losartan 25 milliGRAM(s) Oral daily  pantoprazole    Tablet 40 milliGRAM(s) Oral before breakfast  polyethylene glycol 3350 17 Gram(s) Oral daily  senna 2 Tablet(s) Oral at bedtime  simvastatin 20 milliGRAM(s) Oral at bedtime    MEDICATIONS  (PRN):  aluminum hydroxide/magnesium hydroxide/simethicone Suspension 30 milliLiter(s) Oral every 4 hours PRN Indigestion  dextrose 40% Gel 15 Gram(s) Oral once PRN Blood Glucose LESS THAN 70 milliGRAM(s)/deciliter  glucagon  Injectable 1 milliGRAM(s) IntraMuscular once PRN Glucose LESS THAN 70 milligrams/deciliter  ibuprofen  Tablet. 400 milliGRAM(s) Oral every 6 hours PRN Severe Pain (7 - 10)  oxyCODONE    IR 5 milliGRAM(s) Oral every 6 hours PRN Severe Pain (7 - 10)        RECENT LABS/IMAGING                        10.3   5.94  )-----------( 179      ( 05 Aug 2020 06:24 )             30.3     08-05    135  |  98  |  18  ----------------------------<  169<H>  4.5   |  26  |  1.1    Ca    9.3      05 Aug 2020 06:24  Phos  3.0     08-05  Mg     2.2     08-05    TPro  6.5  /  Alb  4.1  /  TBili  0.6  /  DBili  x   /  AST  31  /  ALT  23  /  AlkPhos  53  08-05      Urinalysis Basic - ( 04 Aug 2020 16:33 )    Color: Light Yellow / Appearance: Clear / S.015 / pH: x  Gluc: x / Ketone: Negative  / Bili: Negative / Urobili: <2 mg/dL   Blood: x / Protein: Trace / Nitrite: Negative   Leuk Esterase: Negative / RBC: x / WBC x   Sq Epi: x / Non Sq Epi: x / Bacteria: x    anemia other labs stable

## 2020-08-06 LAB
FOLATE SERPL-MCNC: >20 NG/ML — SIGNIFICANT CHANGE UP
GLUCOSE BLDC GLUCOMTR-MCNC: 147 MG/DL — HIGH (ref 70–99)
GLUCOSE BLDC GLUCOMTR-MCNC: 147 MG/DL — HIGH (ref 70–99)
GLUCOSE BLDC GLUCOMTR-MCNC: 189 MG/DL — HIGH (ref 70–99)
RBC # BLD: 2.9 M/UL — LOW (ref 4.2–5.4)
RETICS #: 56.8 K/UL — SIGNIFICANT CHANGE UP (ref 25–125)
RETICS/RBC NFR: 2 % — HIGH (ref 0.5–1.5)

## 2020-08-06 RX ADMIN — Medication 81 MILLIGRAM(S): at 13:10

## 2020-08-06 RX ADMIN — Medication 1 MILLIGRAM(S): at 13:10

## 2020-08-06 RX ADMIN — Medication 650 MILLIGRAM(S): at 13:47

## 2020-08-06 RX ADMIN — OXYCODONE HYDROCHLORIDE 5 MILLIGRAM(S): 5 TABLET ORAL at 17:06

## 2020-08-06 RX ADMIN — LOSARTAN POTASSIUM 25 MILLIGRAM(S): 100 TABLET, FILM COATED ORAL at 05:41

## 2020-08-06 RX ADMIN — Medication 1: at 17:09

## 2020-08-06 RX ADMIN — ENOXAPARIN SODIUM 40 MILLIGRAM(S): 100 INJECTION SUBCUTANEOUS at 13:11

## 2020-08-06 RX ADMIN — Medication 650 MILLIGRAM(S): at 13:10

## 2020-08-06 RX ADMIN — OXYCODONE HYDROCHLORIDE 5 MILLIGRAM(S): 5 TABLET ORAL at 11:19

## 2020-08-06 RX ADMIN — OXYCODONE HYDROCHLORIDE 5 MILLIGRAM(S): 5 TABLET ORAL at 13:49

## 2020-08-06 RX ADMIN — SENNA PLUS 2 TABLET(S): 8.6 TABLET ORAL at 21:29

## 2020-08-06 RX ADMIN — Medication 650 MILLIGRAM(S): at 06:30

## 2020-08-06 RX ADMIN — Medication 650 MILLIGRAM(S): at 05:41

## 2020-08-06 RX ADMIN — SIMVASTATIN 20 MILLIGRAM(S): 20 TABLET, FILM COATED ORAL at 21:27

## 2020-08-06 RX ADMIN — Medication 650 MILLIGRAM(S): at 17:10

## 2020-08-06 RX ADMIN — OXYCODONE HYDROCHLORIDE 5 MILLIGRAM(S): 5 TABLET ORAL at 09:03

## 2020-08-06 RX ADMIN — PANTOPRAZOLE SODIUM 40 MILLIGRAM(S): 20 TABLET, DELAYED RELEASE ORAL at 05:41

## 2020-08-06 NOTE — PROGRESS NOTE ADULT - SUBJECTIVE AND OBJECTIVE BOX
Patient is a 89y old  Female who presents with a chief complaint of rehab of left pelvic FX (04 Aug 2020 13:29)    HPI:  89y Female with PMH of hard of hearing, Diabetes mellitus 2, Hypertension, TIAx1, came to the ED s/p Fall. Pt was walking down the stairs and after reaching the bottom step she lost her balance. Pt fell on her left side and complains of head, lower chest, wrist and leg pain. +HT, -LOC, -AC, GCS 15. Pt was not able to get up but was helped up to the chair when help arrived and then brought to the ED. Pt denies chest pain, sob, abd pain, nausea, vomiting, dizziness. Pt is unable to ambulate and ROM is restricted due to pain.  was DX w pelvic FX. pt was evaluated by physiatrist and deemed a good acute rehab candidate can tolerate and benefit from 3hrs of rehab to improve function for safe DC home    SUBJECTIVE  No acute overnight events. VSS. Patient continues to complain of left sided pelvic pain. Educated to press help button to request medication. She verbally expresses understanding.    ROS unchanged from prior    PHYSICAL EXAM    Vital Signs Last 24 Hrs  T(C): 35.6 (06 Aug 2020 05:52), Max: 36.2 (05 Aug 2020 20:58)  T(F): 96.1 (06 Aug 2020 05:52), Max: 97.1 (05 Aug 2020 20:58)  HR: 87 (06 Aug 2020 05:52) (67 - 87)  BP: 178/77 (06 Aug 2020 05:52) (132/63 - 205/96)  BP(mean): --  RR: 18 (05 Aug 2020 20:58) (18 - 18)  SpO2: --    General: NAD, Resting Comfortable    in bed                          HEENT: NC/AT, EOM I, PERRLA, Normal Conjunctiva ecchymosis left side of neck   Cardio: RRR, Normal S1-S2, No M/G/R                              Pulm: No Respiratory Distress,  Lungs CTAB                        Abdomen: ND/NT, Soft, BS+                                                MSK: No joint swelling, Full ROM  , pain on left rib cage                                       Ext: No C/C/E, Pulses 2+ throughout, No calf tenderness    Skin:  all skin intact                                                                 Wounds: none  Decubitus Ulcers: None Present     Neurological Examination    Cognitive: AAO x 3                                                                         Attention: Intact   Judgment: Good evidence of judgement                                   Mood/Affect: wnl                                                                           Communication:  Fluent,  No dysarthria   Swallow: intact  CN II - XII  intact  Coordination: FTN/HTS intact                                                                              Sensory: Intact to light touch, PP and Vibration                                                                                             Tone: normal Throughout   Balance: impaired    Motor    LEFT    UE: 4/5  RIGHT UE: 4/5  LEFT    LE:  4-/5  RIGHT LE: 4/5    Reflex:  2 + throughout, (04 Aug 2020 13:29)    acetaminophen   Tablet .. 650 milliGRAM(s) Oral every 6 hours  aluminum hydroxide/magnesium hydroxide/simethicone Suspension 30 milliLiter(s) Oral every 4 hours PRN  aspirin enteric coated 81 milliGRAM(s) Oral daily  dextrose 40% Gel 15 Gram(s) Oral once PRN  dextrose 5%. 1000 milliLiter(s) IV Continuous <Continuous>  dextrose 50% Injectable 12.5 Gram(s) IV Push once  dextrose 50% Injectable 25 Gram(s) IV Push once  dextrose 50% Injectable 25 Gram(s) IV Push once  enoxaparin Injectable 40 milliGRAM(s) SubCutaneous daily  folic acid 1 milliGRAM(s) Oral daily  glucagon  Injectable 1 milliGRAM(s) IntraMuscular once PRN  ibuprofen  Tablet. 400 milliGRAM(s) Oral every 6 hours PRN  insulin lispro (HumaLOG) corrective regimen sliding scale   SubCutaneous three times a day before meals  losartan 25 milliGRAM(s) Oral daily  oxyCODONE    IR 5 milliGRAM(s) Oral every 6 hours PRN  pantoprazole    Tablet 40 milliGRAM(s) Oral before breakfast  polyethylene glycol 3350 17 Gram(s) Oral daily  senna 2 Tablet(s) Oral at bedtime  simvastatin 20 milliGRAM(s) Oral at bedtime      RECENT LABS/IMAGING                        10.3   5.94  )-----------( 179      ( 05 Aug 2020 06:24 )             30.3     08-05    135  |  98  |  18  ----------------------------<  169<H>  4.5   |  26  |  1.1    Ca    9.3      05 Aug 2020 06:24  Phos  3.0     08-05  Mg     2.2     08-05    TPro  6.5  /  Alb  4.1  /  TBili  0.6  /  DBili  x   /  AST  31  /  ALT  23  /  AlkPhos  53  08-05      Urinalysis Basic - ( 04 Aug 2020 16:33 )    Color: Light Yellow / Appearance: Clear / S.015 / pH: x  Gluc: x / Ketone: Negative  / Bili: Negative / Urobili: <2 mg/dL   Blood: x / Protein: Trace / Nitrite: Negative   Leuk Esterase: Negative / RBC: x / WBC x   Sq Epi: x / Non Sq Epi: x / Bacteria: x Patient is a 89y old  Female who presents with a chief complaint of rehab of left pelvic FX (04 Aug 2020 13:29)    HPI:  89y Female with PMH of hard of hearing, Diabetes mellitus 2, Hypertension, TIAx1, came to the ED s/p Fall. Pt was walking down the stairs and after reaching the bottom step she lost her balance. Pt fell on her left side and complains of head, lower chest, wrist and leg pain. +HT, -LOC, -AC, GCS 15. Pt was not able to get up but was helped up to the chair when help arrived and then brought to the ED. Pt denies chest pain, sob, abd pain, nausea, vomiting, dizziness. Pt is unable to ambulate and ROM is restricted due to pain.  was DX w pelvic FX. pt was evaluated by physiatrist and deemed a good acute rehab candidate can tolerate and benefit from 3hrs of rehab to improve function for safe DC home    SUBJECTIVE  No acute overnight events. VSS. Patient continues to complain of left sided pelvic pain. Educated to press help button to request medication. She verbally expresses understanding.    ROS unchanged from prior    PHYSICAL EXAM    Vital Signs Last 24 Hrs=AVSS  T(C): 35.6 (06 Aug 2020 05:52), Max: 36.2 (05 Aug 2020 20:58)  T(F): 96.1 (06 Aug 2020 05:52), Max: 97.1 (05 Aug 2020 20:58)  HR: 87 (06 Aug 2020 05:52) (67 - 87)  BP: 178/77 (06 Aug 2020 05:52) (132/63 - 205/96)  BP(mean): --  RR: 18 (05 Aug 2020 20:58) (18 - 18)  SpO2: --on room air    General: NAD, Resting Comfortable    in bed   still w left hip pain                       HEENT: NC/AT, EOM I, PERRLA, Normal Conjunctiva, ecchymosis left side of neck   Cardio: RRR, Normal S1-S2, No M/G/R                              Pulm: No Respiratory Distress,  Lungs CTAB                        Abdomen: ND/NT, Soft, BS+                                                MSK: No joint swelling, Full ROM  , pain on left rib cage                                       Ext: No C/C/E, Pulses 2+ throughout, No calf tenderness    Skin:  all skin intact                                                                 Wounds: none  Decubitus Ulcers: None Present     Neurological Examination    Cognitive: AAO x 3                                                                         Attention: Intact   Judgment: Good evidence of judgement                                   Mood/Affect: wnl                                                                           Communication:  Fluent,  No dysarthria   Swallow: intact  CN II - XII  intact  Coordination: FTN/HTS intact                                                                              Sensory: Intact to light touch, PP and Vibration                                                                                             Tone: normal Throughout   Balance: impaired    Motor    LEFT    UE: 4/5  RIGHT UE: 4/5  LEFT    LE:  4-/5  RIGHT LE: 4/5    Reflex:  2 + throughout, (04 Aug 2020 13:29)    acetaminophen   Tablet .. 650 milliGRAM(s) Oral every 6 hours  aluminum hydroxide/magnesium hydroxide/simethicone Suspension 30 milliLiter(s) Oral every 4 hours PRN  aspirin enteric coated 81 milliGRAM(s) Oral daily  dextrose 40% Gel 15 Gram(s) Oral once PRN  dextrose 5%. 1000 milliLiter(s) IV Continuous <Continuous>  dextrose 50% Injectable 12.5 Gram(s) IV Push once  dextrose 50% Injectable 25 Gram(s) IV Push once  dextrose 50% Injectable 25 Gram(s) IV Push once  enoxaparin Injectable 40 milliGRAM(s) SubCutaneous daily  folic acid 1 milliGRAM(s) Oral daily  glucagon  Injectable 1 milliGRAM(s) IntraMuscular once PRN  ibuprofen  Tablet. 400 milliGRAM(s) Oral every 6 hours PRN  insulin lispro (HumaLOG) corrective regimen sliding scale   SubCutaneous three times a day before meals  losartan 25 milliGRAM(s) Oral daily  oxyCODONE    IR 5 milliGRAM(s) Oral every 6 hours PRN  pantoprazole    Tablet 40 milliGRAM(s) Oral before breakfast  polyethylene glycol 3350 17 Gram(s) Oral daily  senna 2 Tablet(s) Oral at bedtime  simvastatin 20 milliGRAM(s) Oral at bedtime      RECENT LABS/IMAGING                        10.3   5.94  )-----------( 179      ( 05 Aug 2020 06:24 )             30.3     08-05    135  |  98  |  18  ----------------------------<  169<H>  4.5   |  26  |  1.1    Ca    9.3      05 Aug 2020 06:24  Phos  3.0     08-05  Mg     2.2     08-05    TPro  6.5  /  Alb  4.1  /  TBili  0.6  /  DBili  x   /  AST  31  /  ALT  23  /  AlkPhos  53  08-05      Urinalysis Basic - ( 04 Aug 2020 16:33 )    Color: Light Yellow / Appearance: Clear / S.015 / pH: x  Gluc: x / Ketone: Negative  / Bili: Negative / Urobili: <2 mg/dL   Blood: x / Protein: Trace / Nitrite: Negative   Leuk Esterase: Negative / RBC: x / WBC x   Sq Epi: x / Non Sq Epi: x / Bacteria: x    labs acceptable

## 2020-08-06 NOTE — PROGRESS NOTE ADULT - ASSESSMENT
89 yr old [place] female left pelvic pubic rami fx post fall    Comorbidities MEDICAL ISSUES:  anemia =folic acid , iron studies , hemoccult stools  DM2 metformin   check B12  HTN losartan  HLD TIA zocor, asa 81mg  [ PROBLEM LIST]     -Pain control: Tylenol , oxycodone PRN    -GI/Bowel Mgmt -  miralax, protonix, maalox or mylanta    -Bladder management: voids well    - DVT: Lovenox, TEDs     #Skin:  -No active issues at this time    FEN   Diet -  diabetic dash    [place] =speak louder as needed or pocket talker    osteoporosis by FX check vit D, add calcium  Precautions / PROPHYLAXIS:      - Falls, Cardiac    - Ortho: Weight bearing status: wbat    Patient was seen and discussed with my attending, Dr. Portillo

## 2020-08-07 LAB
24R-OH-CALCIDIOL SERPL-MCNC: 37 NG/ML — SIGNIFICANT CHANGE UP (ref 30–80)
GLUCOSE BLDC GLUCOMTR-MCNC: 139 MG/DL — HIGH (ref 70–99)
GLUCOSE BLDC GLUCOMTR-MCNC: 143 MG/DL — HIGH (ref 70–99)
GLUCOSE BLDC GLUCOMTR-MCNC: 171 MG/DL — HIGH (ref 70–99)
GLUCOSE BLDC GLUCOMTR-MCNC: 175 MG/DL — HIGH (ref 70–99)

## 2020-08-07 RX ORDER — LIDOCAINE 4 G/100G
1 CREAM TOPICAL DAILY
Refills: 0 | Status: DISCONTINUED | OUTPATIENT
Start: 2020-08-07 | End: 2020-08-14

## 2020-08-07 RX ADMIN — ENOXAPARIN SODIUM 40 MILLIGRAM(S): 100 INJECTION SUBCUTANEOUS at 13:05

## 2020-08-07 RX ADMIN — LIDOCAINE 1 PATCH: 4 CREAM TOPICAL at 13:07

## 2020-08-07 RX ADMIN — Medication 650 MILLIGRAM(S): at 08:25

## 2020-08-07 RX ADMIN — Medication 650 MILLIGRAM(S): at 00:07

## 2020-08-07 RX ADMIN — Medication 650 MILLIGRAM(S): at 06:07

## 2020-08-07 RX ADMIN — SENNA PLUS 2 TABLET(S): 8.6 TABLET ORAL at 21:54

## 2020-08-07 RX ADMIN — SIMVASTATIN 20 MILLIGRAM(S): 20 TABLET, FILM COATED ORAL at 21:54

## 2020-08-07 RX ADMIN — Medication 650 MILLIGRAM(S): at 17:01

## 2020-08-07 RX ADMIN — Medication 81 MILLIGRAM(S): at 13:04

## 2020-08-07 RX ADMIN — Medication 650 MILLIGRAM(S): at 13:04

## 2020-08-07 RX ADMIN — LOSARTAN POTASSIUM 25 MILLIGRAM(S): 100 TABLET, FILM COATED ORAL at 06:07

## 2020-08-07 RX ADMIN — Medication 1 MILLIGRAM(S): at 13:08

## 2020-08-07 RX ADMIN — Medication 650 MILLIGRAM(S): at 01:01

## 2020-08-07 RX ADMIN — PANTOPRAZOLE SODIUM 40 MILLIGRAM(S): 20 TABLET, DELAYED RELEASE ORAL at 06:07

## 2020-08-07 RX ADMIN — Medication 1: at 08:26

## 2020-08-07 RX ADMIN — LIDOCAINE 1 PATCH: 4 CREAM TOPICAL at 21:55

## 2020-08-07 NOTE — PROGRESS NOTE ADULT - ASSESSMENT
89 yr old [place] female left pelvic pubic rami fx post fall    Comorbidities MEDICAL ISSUES:  #anemia - Hgb 10.3  - folic acid wnl, b12 wnl, iron studies pending, hemoccult stools pending  #DM2   - metformin  #HTN   - losartan  #HLD TIA   - zocor, asa 81mg     -Pain control: Tylenol, oxycodone PRN, lidoderm patch    -GI/Bowel Mgmt -  miralax, protonix, maalox or mylanta    -Bladder management: voids well    - DVT: Lovenox, TEDs     #Skin:  -No active issues at this time    FEN   Diet -  diabetic dash   [place] =speak louder as needed or pocket talker    osteoporosis by FX check vit D, add calcium  Precautions / PROPHYLAXIS:      - Falls, Cardiac    - Ortho: Weight bearing status: wbat    Patient was seen and discussed with my attending, Dr. Portillo 89 yr old [place] female left pelvic pubic rami fx post fall  continue 3 hrs of acute rehab     Comorbidities MEDICAL ISSUES:  #anemia - Hgb 10.3  - folic acid wnl, b12 wnl, iron studies pending, hemoccult stools pending  #DM2   - metformin  #HTN   - losartan  #HLD TIA   - zocor, asa 81mg     -Pain control: Tylenol, oxycodone PRN, lidoderm patch    -GI/Bowel Mgmt -  miralax, protonix, maalox or mylanta    -Bladder management: voids well    - DVT: Lovenox, TEDs     #Skin:  -No active issues at this time    FEN   Diet -  diabetic dash   [place] =speak louder as needed or pocket talker    osteoporosis by FX check vit D, add calcium  Precautions / PROPHYLAXIS:      - Falls, Cardiac    - Ortho: Weight bearing status: wbat    Patient was seen and discussed with my attending, Dr. Portillo

## 2020-08-07 NOTE — PROGRESS NOTE ADULT - SUBJECTIVE AND OBJECTIVE BOX
Patient is a 89y old  Female who presents with a chief complaint of rehab of left pelvic FX (04 Aug 2020 13:29)    HPI:  89y Female with PMH of hard of hearing, Diabetes mellitus 2, Hypertension, TIAx1, came to the ED s/p Fall. Pt was walking down the stairs and after reaching the bottom step she lost her balance. Pt fell on her left side and complains of head, lower chest, wrist and leg pain. +HT, -LOC, -AC, GCS 15. Pt was not able to get up but was helped up to the chair when help arrived and then brought to the ED. Pt denies chest pain, sob, abd pain, nausea, vomiting, dizziness. Pt is unable to ambulate and ROM is restricted due to pain.  was DX w pelvic FX. pt was evaluated by physiatrist and deemed a good acute rehab candidate can tolerate and benefit from 3hrs of rehab to improve function for safe DC home    SUBJECTIVE  No acute overnight events. VSS. Patient continues to complain of left sided pelvic pain. Lidocaine patches added to pain regimen. Iron studies reordered for anemia. Folate and B12 wnl. Retic count appropriate.    ROS unchanged from prior    PHYSICAL EXAM    Vital Signs Last 24 Hrs  T(C): 36 (07 Aug 2020 05:02), Max: 36.5 (06 Aug 2020 19:51)  T(F): 96.8 (07 Aug 2020 05:02), Max: 97.7 (06 Aug 2020 19:51)  HR: 89 (07 Aug 2020 05:02) (77 - 89)  BP: 142/71 (07 Aug 2020 05:02) (142/71 - 196/84)  BP(mean): --  RR: 18 (07 Aug 2020 05:02) (18 - 18)  SpO2: --    General: NAD, Resting Comfortable in bed, complaining of left hip pain                     HEENT: NC/AT, EOM I, PERRLA, Normal Conjunctiva, ecchymosis left side of neck   Cardio: RRR, Normal S1-S2, No M/G/R                              Pulm: No Respiratory Distress,  Lungs CTAB                        Abdomen: ND/NT, Soft, BS+                                                MSK: No joint swelling, Full ROM  , pain on left rib cage                                       Ext: No C/C/E, Pulses 2+ throughout, No calf tenderness    Skin:  all skin intact                                                                 Wounds: none  Decubitus Ulcers: None Present     Neurological Examination    Cognitive: AAO x 3                                                                         Attention: Intact   Judgment: Good evidence of judgement                                   Mood/Affect: wnl                                                                           Communication:  Fluent,  No dysarthria   Swallow: intact  CN II - XII  intact  Coordination: FTN/HTS intact                                                                              Sensory: Intact to light touch, PP and Vibration                                                                                             Tone: normal Throughout   Balance: impaired    Motor    LEFT    UE: 4/5  RIGHT UE: 4/5  LEFT    LE:  4-/5  RIGHT LE: 4/5    Reflex:  2 + throughout, (04 Aug 2020 13:29)    acetaminophen   Tablet .. 650 milliGRAM(s) Oral every 6 hours  aluminum hydroxide/magnesium hydroxide/simethicone Suspension 30 milliLiter(s) Oral every 4 hours PRN  aspirin enteric coated 81 milliGRAM(s) Oral daily  dextrose 40% Gel 15 Gram(s) Oral once PRN  dextrose 5%. 1000 milliLiter(s) IV Continuous <Continuous>  dextrose 50% Injectable 12.5 Gram(s) IV Push once  dextrose 50% Injectable 25 Gram(s) IV Push once  dextrose 50% Injectable 25 Gram(s) IV Push once  enoxaparin Injectable 40 milliGRAM(s) SubCutaneous daily  folic acid 1 milliGRAM(s) Oral daily  glucagon  Injectable 1 milliGRAM(s) IntraMuscular once PRN  ibuprofen  Tablet. 400 milliGRAM(s) Oral every 6 hours PRN  insulin lispro (HumaLOG) corrective regimen sliding scale   SubCutaneous three times a day before meals  lidocaine   Patch 1 Patch Transdermal daily  losartan 25 milliGRAM(s) Oral daily  oxyCODONE    IR 5 milliGRAM(s) Oral every 6 hours PRN  pantoprazole    Tablet 40 milliGRAM(s) Oral before breakfast  polyethylene glycol 3350 17 Gram(s) Oral daily  senna 2 Tablet(s) Oral at bedtime  simvastatin 20 milliGRAM(s) Oral at bedtime Patient is a 89y old  Female who presents with a chief complaint of rehab of left pelvic FX (04 Aug 2020 13:29)    HPI:  89y Female with PMH of hard of hearing, Diabetes mellitus 2, Hypertension, TIAx1, came to the ED s/p Fall. Pt was walking down the stairs and after reaching the bottom step she lost her balance. Pt fell on her left side and complains of head, lower chest, wrist and leg pain. +HT, -LOC, -AC, GCS 15. Pt was not able to get up but was helped up to the chair when help arrived and then brought to the ED. Pt denies chest pain, sob, abd pain, nausea, vomiting, dizziness. Pt is unable to ambulate and ROM is restricted due to pain.  was DX w pelvic FX. pt was evaluated by physiatrist and deemed a good acute rehab candidate can tolerate and benefit from 3hrs of rehab to improve function for safe DC home    SUBJECTIVE  No acute overnight events. VSS. Patient continues to complain of left sided pelvic pain. Lidocaine patches added to pain regimen. Iron studies reordered for anemia. Folate and B12 wnl. Retic count appropriate.    ROS unchanged from prior    PHYSICAL EXAM    Vital Signs Last 24 Hrs=AVSS  T(C): 36 (07 Aug 2020 05:02), Max: 36.5 (06 Aug 2020 19:51)  T(F): 96.8 (07 Aug 2020 05:02), Max: 97.7 (06 Aug 2020 19:51)  HR: 89 (07 Aug 2020 05:02) (77 - 89)  BP: 142/71 (07 Aug 2020 05:02) (142/71 - 196/84)  BP(mean): --  RR: 18 (07 Aug 2020 05:02) (18 - 18)  SpO2: --    General: NAD, Resting Comfortable in bed, complaining of left hip pain                     HEENT: NC/AT, EOM I, PERRLA, Normal Conjunctiva, ecchymosis left side of neck   Cardio: RRR, Normal S1-S2, No M/G/R                              Pulm: No Respiratory Distress,  Lungs CTAB                        Abdomen: ND/NT, Soft, BS+                                                MSK: No joint swelling, Full ROM  , pain on left rib cage                                       Ext: No C/C/E, Pulses 2+ throughout, No calf tenderness    Skin:  all skin intact                                                                 Wounds: none  Decubitus Ulcers: None Present     Neurological Examination    Cognitive: AAO x 3                                                                         Attention: Intact   Judgment: Good evidence of judgement                                   Mood/Affect: wnl                                                                           Communication:  Fluent,  No dysarthria   Swallow: intact  CN II - XII  intact  Coordination: FTN/HTS intact                                                                              Sensory: Intact to light touch, PP and Vibration                                                                                             Tone: normal Throughout   Balance: impaired    Motor    LEFT    UE: 4/5  RIGHT UE: 4/5  LEFT    LE:  4-/5  RIGHT LE: 4/5    Reflex:  2 + throughout, (04 Aug 2020 13:29)    acetaminophen   Tablet .. 650 milliGRAM(s) Oral every 6 hours  aluminum hydroxide/magnesium hydroxide/simethicone Suspension 30 milliLiter(s) Oral every 4 hours PRN  aspirin enteric coated 81 milliGRAM(s) Oral daily  dextrose 40% Gel 15 Gram(s) Oral once PRN  dextrose 5%. 1000 milliLiter(s) IV Continuous <Continuous>  dextrose 50% Injectable 12.5 Gram(s) IV Push once  dextrose 50% Injectable 25 Gram(s) IV Push once  dextrose 50% Injectable 25 Gram(s) IV Push once  enoxaparin Injectable 40 milliGRAM(s) SubCutaneous daily  folic acid 1 milliGRAM(s) Oral daily  glucagon  Injectable 1 milliGRAM(s) IntraMuscular once PRN  ibuprofen  Tablet. 400 milliGRAM(s) Oral every 6 hours PRN  insulin lispro (HumaLOG) corrective regimen sliding scale   SubCutaneous three times a day before meals  lidocaine   Patch 1 Patch Transdermal daily  losartan 25 milliGRAM(s) Oral daily  oxyCODONE    IR 5 milliGRAM(s) Oral every 6 hours PRN  pantoprazole    Tablet 40 milliGRAM(s) Oral before breakfast  polyethylene glycol 3350 17 Gram(s) Oral daily  senna 2 Tablet(s) Oral at bedtime  simvastatin 20 milliGRAM(s) Oral at bedtime

## 2020-08-07 NOTE — CHART NOTE - NSCHARTNOTEFT_GEN_A_CORE
Registered Dietitian Follow-Up     Patient Profile Reviewed                           Yes [x]   No []     Nutrition History Previously Obtained        Yes [x]  No []       Pertinent Subjective Information: Pt continues to report poor appetite consuming <50% of meals. Pt did not receive rec supp by previous RD due to current diet order was just activated       Pertinent Medical Interventions: Rehab of left pelvic FX s/p fall. Anemia monitor. DM 2 on oral meds     Diet order: consistent carb no snacks, Glucerna shake BID     Anthropometrics:  - Ht. 63"  - Wt. 109 lbs (8/5; dosing wt)- no new wts doc since last RD assessment   - %wt change  - BMI 19.3  - IBW  52 kg     Pertinent Lab Data: 8/5: RBC-3.03, H/H- 10.3/30.3, glucose serum- 169, POCT BG- 138, 188, 153; 8/7: POCT BG- 175, 143; 8/1: KlsP1p-7.2     Pertinent Meds: Lovenox, aluminum hydroxide, folic acid, losartan, protonix, miralax, senna, simvastatin     Physical Findings:  - Appearance: AAOx4; no edema noted  - GI function: no GI issues noted, LBM 8/7 (per pt)  - Tubes: none  - Oral/Mouth cavity: none chewing/swallowing difficulties noted   - Skin: intact     Nutrition Requirements  Weight Used:  lbs-- continue from initial RD assessment 8/5     Estimated Energy Needs    Continue [x]  Adjust []  1040- 1127 kcal/day (MSJ x 1.2-1.3 AF)     Estimated Protein Needs    Continue [x]  Adjust []  50-60 g/day (1-1.2 g/kg CBW)     Estimated Fluid Needs        Continue [x]  Adjust []  1ml/kcal or per LIP     Nutrient Intake: not meeting needs      [x] Previous Nutrition Diagnosis: Inadequate oral intake            [x] Ongoing          [] Resolved    Nutrition Intervention meals and snacks, coordination of care     Goal/Expected Outcome: Pt to consume >75% of meals+ supplement in 4 days.     Indicator/Monitoring: RD to monitor energy intake, body comp, NFPF, glucose profile, anemia profile.    Recommendations: Continue current diet order as rec. Provide assistance with meals and encourage adequate po intake.

## 2020-08-08 LAB
GLUCOSE BLDC GLUCOMTR-MCNC: 137 MG/DL — HIGH (ref 70–99)
GLUCOSE BLDC GLUCOMTR-MCNC: 172 MG/DL — HIGH (ref 70–99)

## 2020-08-08 RX ADMIN — Medication 650 MILLIGRAM(S): at 01:01

## 2020-08-08 RX ADMIN — Medication 650 MILLIGRAM(S): at 13:26

## 2020-08-08 RX ADMIN — ENOXAPARIN SODIUM 40 MILLIGRAM(S): 100 INJECTION SUBCUTANEOUS at 12:58

## 2020-08-08 RX ADMIN — Medication 650 MILLIGRAM(S): at 17:26

## 2020-08-08 RX ADMIN — LIDOCAINE 1 PATCH: 4 CREAM TOPICAL at 12:59

## 2020-08-08 RX ADMIN — SENNA PLUS 2 TABLET(S): 8.6 TABLET ORAL at 21:56

## 2020-08-08 RX ADMIN — Medication 1 MILLIGRAM(S): at 12:59

## 2020-08-08 RX ADMIN — Medication 81 MILLIGRAM(S): at 12:59

## 2020-08-08 RX ADMIN — OXYCODONE HYDROCHLORIDE 5 MILLIGRAM(S): 5 TABLET ORAL at 08:48

## 2020-08-08 RX ADMIN — Medication 650 MILLIGRAM(S): at 00:31

## 2020-08-08 RX ADMIN — LIDOCAINE 1 PATCH: 4 CREAM TOPICAL at 00:31

## 2020-08-08 RX ADMIN — Medication 650 MILLIGRAM(S): at 05:27

## 2020-08-08 RX ADMIN — SIMVASTATIN 20 MILLIGRAM(S): 20 TABLET, FILM COATED ORAL at 21:56

## 2020-08-08 RX ADMIN — Medication 650 MILLIGRAM(S): at 23:45

## 2020-08-08 RX ADMIN — Medication 650 MILLIGRAM(S): at 12:59

## 2020-08-08 RX ADMIN — LIDOCAINE 1 PATCH: 4 CREAM TOPICAL at 23:44

## 2020-08-08 RX ADMIN — PANTOPRAZOLE SODIUM 40 MILLIGRAM(S): 20 TABLET, DELAYED RELEASE ORAL at 08:46

## 2020-08-08 RX ADMIN — LOSARTAN POTASSIUM 25 MILLIGRAM(S): 100 TABLET, FILM COATED ORAL at 05:27

## 2020-08-08 RX ADMIN — OXYCODONE HYDROCHLORIDE 5 MILLIGRAM(S): 5 TABLET ORAL at 17:26

## 2020-08-08 RX ADMIN — OXYCODONE HYDROCHLORIDE 5 MILLIGRAM(S): 5 TABLET ORAL at 09:03

## 2020-08-08 RX ADMIN — POLYETHYLENE GLYCOL 3350 17 GRAM(S): 17 POWDER, FOR SOLUTION ORAL at 13:00

## 2020-08-08 RX ADMIN — LIDOCAINE 1 PATCH: 4 CREAM TOPICAL at 20:07

## 2020-08-08 NOTE — PROGRESS NOTE ADULT - SUBJECTIVE AND OBJECTIVE BOX
T(C): 35.3 (08-08-20 @ 05:19), Max: 36.8 (08-07-20 @ 21:23)  HR: 88 (08-08-20 @ 05:19) (84 - 88)  BP: 141/69 (08-08-20 @ 05:19) (141/69 - 177/78)  RR: 18 (08-08-20 @ 05:19) (18 - 18)  SpO2: --      Patient was stable overnight and expresses no new complaints     PE:    Alert   LUNGS- clear  COR- RRR  ABD- SOFT, NT  EXTR- w/o edema  NEURO- stable

## 2020-08-09 LAB — GLUCOSE BLDC GLUCOMTR-MCNC: 135 MG/DL — HIGH (ref 70–99)

## 2020-08-09 RX ORDER — METFORMIN HYDROCHLORIDE 850 MG/1
500 TABLET ORAL DAILY
Refills: 0 | Status: DISCONTINUED | OUTPATIENT
Start: 2020-08-09 | End: 2020-08-14

## 2020-08-09 RX ADMIN — Medication 650 MILLIGRAM(S): at 17:58

## 2020-08-09 RX ADMIN — Medication 650 MILLIGRAM(S): at 05:47

## 2020-08-09 RX ADMIN — Medication 650 MILLIGRAM(S): at 23:38

## 2020-08-09 RX ADMIN — LIDOCAINE 1 PATCH: 4 CREAM TOPICAL at 23:38

## 2020-08-09 RX ADMIN — ENOXAPARIN SODIUM 40 MILLIGRAM(S): 100 INJECTION SUBCUTANEOUS at 12:37

## 2020-08-09 RX ADMIN — LIDOCAINE 1 PATCH: 4 CREAM TOPICAL at 20:04

## 2020-08-09 RX ADMIN — OXYCODONE HYDROCHLORIDE 5 MILLIGRAM(S): 5 TABLET ORAL at 08:00

## 2020-08-09 RX ADMIN — Medication 81 MILLIGRAM(S): at 12:37

## 2020-08-09 RX ADMIN — Medication 1 MILLIGRAM(S): at 12:37

## 2020-08-09 RX ADMIN — Medication 650 MILLIGRAM(S): at 12:37

## 2020-08-09 RX ADMIN — PANTOPRAZOLE SODIUM 40 MILLIGRAM(S): 20 TABLET, DELAYED RELEASE ORAL at 05:47

## 2020-08-09 RX ADMIN — LOSARTAN POTASSIUM 25 MILLIGRAM(S): 100 TABLET, FILM COATED ORAL at 05:47

## 2020-08-09 RX ADMIN — OXYCODONE HYDROCHLORIDE 5 MILLIGRAM(S): 5 TABLET ORAL at 15:43

## 2020-08-09 RX ADMIN — OXYCODONE HYDROCHLORIDE 5 MILLIGRAM(S): 5 TABLET ORAL at 09:38

## 2020-08-09 RX ADMIN — LIDOCAINE 1 PATCH: 4 CREAM TOPICAL at 12:37

## 2020-08-09 RX ADMIN — Medication 650 MILLIGRAM(S): at 13:46

## 2020-08-09 RX ADMIN — SIMVASTATIN 20 MILLIGRAM(S): 20 TABLET, FILM COATED ORAL at 21:21

## 2020-08-09 RX ADMIN — SENNA PLUS 2 TABLET(S): 8.6 TABLET ORAL at 21:21

## 2020-08-09 RX ADMIN — OXYCODONE HYDROCHLORIDE 5 MILLIGRAM(S): 5 TABLET ORAL at 16:58

## 2020-08-09 NOTE — PROGRESS NOTE ADULT - SUBJECTIVE AND OBJECTIVE BOX
HPI:  HPI:  89y Female with PMH of hard of hearing, Diabetes mellitus, Hypertension, TIAx1, came to the ED s/p Fall. Pt was walking down the stairs and after reaching the bottom step she lost her balance. Pt fell on her left side and complains of head, lower chest, wrist and leg pain. +HT, -LOC, -AC, GCS 15. Pt was not able to get up but was helped up to the chair when help arrived and then brought to the ED. Pt denies chest pain, sob, abd pain, nausea, vomiting, dizziness. Pt is unable to ambulate and ROM is restricted due to pain.  was DX w pelvic FX. pt was evaluated by physiatrist and deemed a good acute rehab candidate can tolerate and benefit from 3hrs of rehab to improve function for safe DC home                                                                             AVSS  T(C): 35.9 (08-09-20 @ 05:18), Max: 36.3 (08-08-20 @ 14:40)  HR: 87 (08-09-20 @ 05:18) (79 - 87)  BP: 140/63 (08-09-20 @ 05:18) (127/61 - 149/75)  RR: 18 (08-09-20 @ 05:18) (17 - 18)  SpO2: --      Patient was stable overnight and expresses no new complaints     PE: seen in the gym still w pain left pelvic side, but NAD    Alert   LUNGS- clear  COR- RRR  ABD- SOFT, NT  EXTR- w/o edema  NEURO- stable    Continue acute rehab program. pelvic fx , adjust pain meds

## 2020-08-10 LAB
ALBUMIN SERPL ELPH-MCNC: 4.2 G/DL — SIGNIFICANT CHANGE UP (ref 3.5–5.2)
ALP SERPL-CCNC: 72 U/L — SIGNIFICANT CHANGE UP (ref 30–115)
ALT FLD-CCNC: 21 U/L — SIGNIFICANT CHANGE UP (ref 0–41)
ANION GAP SERPL CALC-SCNC: 11 MMOL/L — SIGNIFICANT CHANGE UP (ref 7–14)
AST SERPL-CCNC: 21 U/L — SIGNIFICANT CHANGE UP (ref 0–41)
BILIRUB SERPL-MCNC: 0.5 MG/DL — SIGNIFICANT CHANGE UP (ref 0.2–1.2)
BUN SERPL-MCNC: 17 MG/DL — SIGNIFICANT CHANGE UP (ref 10–20)
CALCIUM SERPL-MCNC: 9.3 MG/DL — SIGNIFICANT CHANGE UP (ref 8.5–10.1)
CHLORIDE SERPL-SCNC: 98 MMOL/L — SIGNIFICANT CHANGE UP (ref 98–110)
CO2 SERPL-SCNC: 27 MMOL/L — SIGNIFICANT CHANGE UP (ref 17–32)
CREAT SERPL-MCNC: 1.1 MG/DL — SIGNIFICANT CHANGE UP (ref 0.7–1.5)
GLUCOSE BLDC GLUCOMTR-MCNC: 126 MG/DL — HIGH (ref 70–99)
GLUCOSE BLDC GLUCOMTR-MCNC: 129 MG/DL — HIGH (ref 70–99)
GLUCOSE BLDC GLUCOMTR-MCNC: 132 MG/DL — HIGH (ref 70–99)
GLUCOSE BLDC GLUCOMTR-MCNC: 184 MG/DL — HIGH (ref 70–99)
GLUCOSE SERPL-MCNC: 220 MG/DL — HIGH (ref 70–99)
HCT VFR BLD CALC: 29.7 % — LOW (ref 37–47)
HGB BLD-MCNC: 10 G/DL — LOW (ref 12–16)
MCHC RBC-ENTMCNC: 33.7 G/DL — SIGNIFICANT CHANGE UP (ref 32–37)
MCHC RBC-ENTMCNC: 34 PG — HIGH (ref 27–31)
MCV RBC AUTO: 101 FL — HIGH (ref 81–99)
NRBC # BLD: 0 /100 WBCS — SIGNIFICANT CHANGE UP (ref 0–0)
PLATELET # BLD AUTO: 242 K/UL — SIGNIFICANT CHANGE UP (ref 130–400)
POTASSIUM SERPL-MCNC: 4.7 MMOL/L — SIGNIFICANT CHANGE UP (ref 3.5–5)
POTASSIUM SERPL-SCNC: 4.7 MMOL/L — SIGNIFICANT CHANGE UP (ref 3.5–5)
PROT SERPL-MCNC: 7 G/DL — SIGNIFICANT CHANGE UP (ref 6–8)
RBC # BLD: 2.94 M/UL — LOW (ref 4.2–5.4)
RBC # FLD: 12.7 % — SIGNIFICANT CHANGE UP (ref 11.5–14.5)
SODIUM SERPL-SCNC: 136 MMOL/L — SIGNIFICANT CHANGE UP (ref 135–146)
WBC # BLD: 5.25 K/UL — SIGNIFICANT CHANGE UP (ref 4.8–10.8)
WBC # FLD AUTO: 5.25 K/UL — SIGNIFICANT CHANGE UP (ref 4.8–10.8)

## 2020-08-10 RX ORDER — IBUPROFEN 200 MG
400 TABLET ORAL EVERY 8 HOURS
Refills: 0 | Status: DISCONTINUED | OUTPATIENT
Start: 2020-08-10 | End: 2020-08-14

## 2020-08-10 RX ADMIN — PANTOPRAZOLE SODIUM 40 MILLIGRAM(S): 20 TABLET, DELAYED RELEASE ORAL at 05:50

## 2020-08-10 RX ADMIN — Medication 650 MILLIGRAM(S): at 23:55

## 2020-08-10 RX ADMIN — Medication 1 MILLIGRAM(S): at 12:37

## 2020-08-10 RX ADMIN — Medication 650 MILLIGRAM(S): at 05:50

## 2020-08-10 RX ADMIN — Medication 400 MILLIGRAM(S): at 22:08

## 2020-08-10 RX ADMIN — LIDOCAINE 1 PATCH: 4 CREAM TOPICAL at 12:35

## 2020-08-10 RX ADMIN — Medication 650 MILLIGRAM(S): at 17:12

## 2020-08-10 RX ADMIN — Medication 650 MILLIGRAM(S): at 14:43

## 2020-08-10 RX ADMIN — ENOXAPARIN SODIUM 40 MILLIGRAM(S): 100 INJECTION SUBCUTANEOUS at 12:37

## 2020-08-10 RX ADMIN — SENNA PLUS 2 TABLET(S): 8.6 TABLET ORAL at 22:07

## 2020-08-10 RX ADMIN — Medication 400 MILLIGRAM(S): at 14:42

## 2020-08-10 RX ADMIN — LOSARTAN POTASSIUM 25 MILLIGRAM(S): 100 TABLET, FILM COATED ORAL at 05:50

## 2020-08-10 RX ADMIN — METFORMIN HYDROCHLORIDE 500 MILLIGRAM(S): 850 TABLET ORAL at 12:36

## 2020-08-10 RX ADMIN — Medication 81 MILLIGRAM(S): at 12:36

## 2020-08-10 RX ADMIN — SIMVASTATIN 20 MILLIGRAM(S): 20 TABLET, FILM COATED ORAL at 22:07

## 2020-08-10 RX ADMIN — Medication 650 MILLIGRAM(S): at 12:36

## 2020-08-10 RX ADMIN — Medication 400 MILLIGRAM(S): at 22:06

## 2020-08-10 NOTE — PROGRESS NOTE ADULT - SUBJECTIVE AND OBJECTIVE BOX
Patient is a 89y old  Female who presents with a chief complaint of rehab of left pelvic FX (04 Aug 2020 13:29)    HPI:  89y Female with PMH of hard of hearing, Diabetes mellitus 2, Hypertension, TIAx1, came to the ED s/p Fall. Pt was walking down the stairs and after reaching the bottom step she lost her balance. Pt fell on her left side and complains of head, lower chest, wrist and leg pain. +HT, -LOC, -AC, GCS 15. Pt was not able to get up but was helped up to the chair when help arrived and then brought to the ED. Pt denies chest pain, sob, abd pain, nausea, vomiting, dizziness. Pt is unable to ambulate and ROM is restricted due to pain.  was DX w pelvic FX. pt was evaluated by physiatrist and deemed a good acute rehab candidate can tolerate and benefit from 3hrs of rehab to improve function for safe DC carlo    SUBJECTIVE  No acute overnight events. VSS. Patient seen and evaluated in the gym. Continues to complain of right pelvic pain. Will schedule medications as patient often does not ask for pain medication. She remains motivated and participates with therapies.    ROS unchanged from prior    OBJECTIVE    Functional Status: Ambulation: 10 ft w/ RW, ModA      PHYSICAL EXAM    Vital Signs Last 24 Hrs  T(C): 35.9 (10 Aug 2020 05:46), Max: 36.1 (09 Aug 2020 13:42)  T(F): 96.7 (10 Aug 2020 05:46), Max: 97 (09 Aug 2020 13:42)  HR: 91 (10 Aug 2020 05:46) (82 - 91)  BP: 178/86 (10 Aug 2020 05:46) (130/67 - 178/86)  BP(mean): --  RR: 18 (10 Aug 2020 05:46) (18 - 18)  SpO2: --    General: NAD, Resting Comfortable                           HEENT: NC/AT, EOM I, PERRLA, Normal Conjunctivae, ecchymosis left side of neck   Cardio: RRR, Normal S1-S2, No M/G/R                              Pulm: No Respiratory Distress,  Lungs CTAB                        Abdomen: ND/NT, Soft, BS+                                                MSK: No joint swelling, Full ROM, tender left rib cage, tender left hip                                       Ext: No C/C/E, Pulses 2+ throughout, No calf tenderness    Skin:  all skin intact                                                                 Wounds: none  Decubitus Ulcers: None Present     Neurological Examination    Cognitive: AAO x 3                                                                         Attention: Intact   Judgment: Good evidence of judgement                                   Mood/Affect: wnl                                                                           Communication:  Fluent,  No dysarthria   Swallow: intact  CN II - XII  intact  Coordination: FTN/HTS intact                                                                              Sensory: Intact to light touch, PP and Vibration                                                                                             Tone: normal Throughout   Balance: impaired    Motor    LEFT    UE: 4/5  RIGHT UE: 4/5  LEFT    LE:  4-/5  RIGHT LE: 4/5    Reflex:  2 + throughout    acetaminophen   Tablet .. 650 milliGRAM(s) Oral every 6 hours  aluminum hydroxide/magnesium hydroxide/simethicone Suspension 30 milliLiter(s) Oral every 4 hours PRN  aspirin enteric coated 81 milliGRAM(s) Oral daily  dextrose 40% Gel 15 Gram(s) Oral once PRN  dextrose 5%. 1000 milliLiter(s) IV Continuous <Continuous>  dextrose 50% Injectable 12.5 Gram(s) IV Push once  dextrose 50% Injectable 25 Gram(s) IV Push once  dextrose 50% Injectable 25 Gram(s) IV Push once  enoxaparin Injectable 40 milliGRAM(s) SubCutaneous daily  folic acid 1 milliGRAM(s) Oral daily  glucagon  Injectable 1 milliGRAM(s) IntraMuscular once PRN  ibuprofen  Tablet. 400 milliGRAM(s) Oral every 6 hours PRN  lidocaine   Patch 1 Patch Transdermal daily  losartan 25 milliGRAM(s) Oral daily  metFORMIN 500 milliGRAM(s) Oral daily  oxyCODONE    IR 5 milliGRAM(s) Oral every 6 hours PRN  pantoprazole    Tablet 40 milliGRAM(s) Oral before breakfast  polyethylene glycol 3350 17 Gram(s) Oral daily  senna 2 Tablet(s) Oral at bedtime  simvastatin 20 milliGRAM(s) Oral at bedtime      RECENT LABS/IMAGING                        10.0   5.25  )-----------( 242      ( 10 Aug 2020 07:52 )             29.7     08-10    136  |  98  |  17  ----------------------------<  220<H>  4.7   |  27  |  1.1    Ca    9.3      10 Aug 2020 07:52    TPro  7.0  /  Alb  4.2  /  TBili  0.5  /  DBili  x   /  AST  21  /  ALT  21  /  AlkPhos  72  08-10 Patient is a 89y old  Female who presents with a chief complaint of rehab of left pelvic FX (04 Aug 2020 13:29)    HPI:  89y Female with PMH of hard of hearing, Diabetes mellitus 2, Hypertension, TIAx1, came to the ED s/p Fall. Pt was walking down the stairs and after reaching the bottom step she lost her balance. Pt fell on her left side and complains of head, lower chest, wrist and leg pain. +HT, -LOC, -AC, GCS 15. Pt was not able to get up but was helped up to the chair when help arrived and then brought to the ED. Pt denies chest pain, sob, abd pain, nausea, vomiting, dizziness. Pt is unable to ambulate and ROM is restricted due to pain.  was DX w pelvic FX. pt was evaluated by physiatrist and deemed a good acute rehab candidate can tolerate and benefit from 3hrs of rehab to improve function for safe DC carlo    SUBJECTIVE  No acute overnight events. VSS. Patient seen and evaluated in the gym. Continues to complain of right pelvic pain. Will schedule medications as patient often does not ask for pain medication. She remains motivated and participates with therapies.    ROS unchanged from prior    OBJECTIVE    Functional Status: Ambulation: 10 ft w/ RW, ModA      PHYSICAL EXAM    Vital Signs Last 24 Hrs=AVSS  T(C): 35.9 (10 Aug 2020 05:46), Max: 36.1 (09 Aug 2020 13:42)  T(F): 96.7 (10 Aug 2020 05:46), Max: 97 (09 Aug 2020 13:42)  HR: 91 (10 Aug 2020 05:46) (82 - 91)  BP: 178/86 (10 Aug 2020 05:46) (130/67 - 178/86)  BP(mean): --  RR: 18 (10 Aug 2020 05:46) (18 - 18)  SpO2: --    General: NAD,seen in the gym                           HEENT: NC/AT, EOM I, PERRLA, Normal Conjunctivae, ecchymosis left side of neck   Cardio: RRR, Normal S1-S2, No M/G/R                              Pulm: No Respiratory Distress,  Lungs CTAB                        Abdomen: ND/NT, Soft, BS+                                                MSK: No joint swelling, Full ROM, tender left rib cage, tender left hip                                       Ext: No C/C/E, Pulses 2+ throughout, No calf tenderness    Skin:  all skin intact                                                                 Wounds: none  Decubitus Ulcers: None Present     Neurological Examination    Cognitive: AAO x 3                                                                         Attention: Intact   Judgment: Good evidence of judgement                                   Mood/Affect: wnl                                                                           Communication:  Fluent,  No dysarthria   Swallow: intact  CN II - XII  intact  Coordination: FTN/HTS intact                                                                              Sensory: Intact to light touch, PP and Vibration                                                                                             Tone: normal Throughout   Balance: impaired    Motor    LEFT    UE: 4/5  RIGHT UE: 4/5  LEFT    LE:  4-/5  RIGHT LE: 4/5    Reflex:  2 + throughout    acetaminophen   Tablet .. 650 milliGRAM(s) Oral every 6 hours  aluminum hydroxide/magnesium hydroxide/simethicone Suspension 30 milliLiter(s) Oral every 4 hours PRN  aspirin enteric coated 81 milliGRAM(s) Oral daily  dextrose 40% Gel 15 Gram(s) Oral once PRN  dextrose 5%. 1000 milliLiter(s) IV Continuous <Continuous>  dextrose 50% Injectable 12.5 Gram(s) IV Push once  dextrose 50% Injectable 25 Gram(s) IV Push once  dextrose 50% Injectable 25 Gram(s) IV Push once  enoxaparin Injectable 40 milliGRAM(s) SubCutaneous daily  folic acid 1 milliGRAM(s) Oral daily  glucagon  Injectable 1 milliGRAM(s) IntraMuscular once PRN  ibuprofen  Tablet. 400 milliGRAM(s) Oral every 6 hours PRN  lidocaine   Patch 1 Patch Transdermal daily  losartan 25 milliGRAM(s) Oral daily  metFORMIN 500 milliGRAM(s) Oral daily  oxyCODONE    IR 5 milliGRAM(s) Oral every 6 hours PRN  pantoprazole    Tablet 40 milliGRAM(s) Oral before breakfast  polyethylene glycol 3350 17 Gram(s) Oral daily  senna 2 Tablet(s) Oral at bedtime  simvastatin 20 milliGRAM(s) Oral at bedtime      RECENT LABS/IMAGING                        10.0   5.25  )-----------( 242      ( 10 Aug 2020 07:52 )             29.7     08-10    136  |  98  |  17  ----------------------------<  220<H>  4.7   |  27  |  1.1    Ca    9.3      10 Aug 2020 07:52    TPro  7.0  /  Alb  4.2  /  TBili  0.5  /  DBili  x   /  AST  21  /  ALT  21  /  AlkPhos  72  08-10   labs acceptable

## 2020-08-10 NOTE — PROGRESS NOTE ADULT - ASSESSMENT
89 yr old [place] female left pelvic pubic rami fx post fall  continue 3 hrs of acute rehab     Comorbidities MEDICAL ISSUES:  #anemia - Hgb 10.0, stable  - folic acid wnl, b12 wnl, iron studies pending, hemoccult stools pending  #DM2   - metformin  #HTN   - losartan  #HLD TIA   - zocor, asa 81mg     -Pain control: Tylenol, oxycodone PRN, lidoderm patch    -GI/Bowel Mgmt -  miralax, protonix, maalox or mylanta    -Bladder management: voids well    - DVT: Lovenox, TEDs     #Skin:  -No active issues at this time    FEN   Diet -  diabetic dash   [place] =speak louder as needed or pocket talker    osteoporosis by FX check vit D, add calcium  Precautions / PROPHYLAXIS:      - Falls, Cardiac    - Ortho: Weight bearing status: wbat    Patient was seen and discussed with my attending, Dr. Portillo

## 2020-08-11 DIAGNOSIS — E11.9 TYPE 2 DIABETES MELLITUS WITHOUT COMPLICATIONS: ICD-10-CM

## 2020-08-11 DIAGNOSIS — S32.592A OTHER SPECIFIED FRACTURE OF LEFT PUBIS, INITIAL ENCOUNTER FOR CLOSED FRACTURE: ICD-10-CM

## 2020-08-11 DIAGNOSIS — Z79.84 LONG TERM (CURRENT) USE OF ORAL HYPOGLYCEMIC DRUGS: ICD-10-CM

## 2020-08-11 DIAGNOSIS — N17.9 ACUTE KIDNEY FAILURE, UNSPECIFIED: ICD-10-CM
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
## 2020-08-11 DIAGNOSIS — I10 ESSENTIAL (PRIMARY) HYPERTENSION: ICD-10-CM

## 2020-08-11 DIAGNOSIS — R07.89 OTHER CHEST PAIN: ICD-10-CM
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
## 2020-08-11 DIAGNOSIS — E78.5 HYPERLIPIDEMIA, UNSPECIFIED: ICD-10-CM

## 2020-08-11 DIAGNOSIS — Z86.73 PERSONAL HISTORY OF TRANSIENT ISCHEMIC ATTACK (TIA), AND CEREBRAL INFARCTION WITHOUT RESIDUAL DEFICITS: ICD-10-CM

## 2020-08-11 LAB
GLUCOSE BLDC GLUCOMTR-MCNC: 111 MG/DL — HIGH (ref 70–99)
GLUCOSE BLDC GLUCOMTR-MCNC: 121 MG/DL — HIGH (ref 70–99)
GLUCOSE BLDC GLUCOMTR-MCNC: 132 MG/DL — HIGH (ref 70–99)
GLUCOSE BLDC GLUCOMTR-MCNC: 139 MG/DL — HIGH (ref 70–99)

## 2020-08-11 RX ADMIN — Medication 650 MILLIGRAM(S): at 11:49

## 2020-08-11 RX ADMIN — Medication 650 MILLIGRAM(S): at 07:46

## 2020-08-11 RX ADMIN — METFORMIN HYDROCHLORIDE 500 MILLIGRAM(S): 850 TABLET ORAL at 11:49

## 2020-08-11 RX ADMIN — Medication 650 MILLIGRAM(S): at 13:42

## 2020-08-11 RX ADMIN — LIDOCAINE 1 PATCH: 4 CREAM TOPICAL at 13:29

## 2020-08-11 RX ADMIN — Medication 400 MILLIGRAM(S): at 13:30

## 2020-08-11 RX ADMIN — ENOXAPARIN SODIUM 40 MILLIGRAM(S): 100 INJECTION SUBCUTANEOUS at 11:50

## 2020-08-11 RX ADMIN — Medication 81 MILLIGRAM(S): at 11:49

## 2020-08-11 RX ADMIN — Medication 650 MILLIGRAM(S): at 17:19

## 2020-08-11 RX ADMIN — Medication 400 MILLIGRAM(S): at 06:02

## 2020-08-11 RX ADMIN — LOSARTAN POTASSIUM 25 MILLIGRAM(S): 100 TABLET, FILM COATED ORAL at 06:02

## 2020-08-11 RX ADMIN — SIMVASTATIN 20 MILLIGRAM(S): 20 TABLET, FILM COATED ORAL at 22:04

## 2020-08-11 RX ADMIN — POLYETHYLENE GLYCOL 3350 17 GRAM(S): 17 POWDER, FOR SOLUTION ORAL at 13:30

## 2020-08-11 RX ADMIN — SENNA PLUS 2 TABLET(S): 8.6 TABLET ORAL at 22:04

## 2020-08-11 RX ADMIN — Medication 400 MILLIGRAM(S): at 07:46

## 2020-08-11 RX ADMIN — Medication 1 MILLIGRAM(S): at 11:49

## 2020-08-11 RX ADMIN — PANTOPRAZOLE SODIUM 40 MILLIGRAM(S): 20 TABLET, DELAYED RELEASE ORAL at 06:02

## 2020-08-11 RX ADMIN — Medication 400 MILLIGRAM(S): at 22:04

## 2020-08-11 RX ADMIN — Medication 650 MILLIGRAM(S): at 06:02

## 2020-08-11 RX ADMIN — Medication 650 MILLIGRAM(S): at 23:41

## 2020-08-11 NOTE — PROGRESS NOTE ADULT - ASSESSMENT
89 yr old [place] female left pelvic pubic rami fx post fall  continue 3 hrs of acute rehab     Comorbidities MEDICAL ISSUES:  #anemia - Hgb 10.0, stable  - folic acid wnl, b12 wnl, iron studies pending, hemoccult stools pending  #DM2   - metformin  #HTN   - losartan  #HLD TIA   - zocor, asa 81mg     -Pain control: Tylenol, ibuprofen, oxycodone PRN, lidoderm patch    -GI/Bowel Mgmt -  miralax, protonix, maalox or mylanta    -Bladder management: voids well    - DVT: Lovenox, TEDs     #Skin:  -No active issues at this time    FEN   Diet -  diabetic dash   [place] =speak louder as needed or pocket talker    osteoporosis by FX check vit D, add calcium  Precautions / PROPHYLAXIS:      - Falls, Cardiac    - Ortho: Weight bearing status: wbat    Patient was seen and discussed with my attending, Dr. Portillo 89 yr old [place] female left pelvic pubic rami fx post fall  continue 3 hrs of acute rehab  DC this week     Comorbidities MEDICAL ISSUES:  #anemia - Hgb 10.0, stable  - folic acid wnl, b12 wnl, iron studies pending, hemoccult stools pending  #DM2   - metformin  #HTN   - losartan  #HLD TIA   - zocor, asa 81mg     -Pain control: Tylenol, ibuprofen, oxycodone PRN, lidoderm patch    -GI/Bowel Mgmt -  miralax, protonix, maalox or mylanta    -Bladder management: voids well    - DVT: Lovenox, TEDs     #Skin:  -No active issues at this time    FEN   Diet -  diabetic dash   [place] =speak louder as needed or pocket talker    osteoporosis by FX check vit D, add calcium  Precautions / PROPHYLAXIS:      - Falls, Cardiac    - Ortho: Weight bearing status: wbat    Patient was seen and discussed with my attending, Dr. Portillo

## 2020-08-11 NOTE — PROGRESS NOTE ADULT - SUBJECTIVE AND OBJECTIVE BOX
Patient is a 89y old Female who presents with a chief complaint of rehab of left pelvic FX (04 Aug 2020 13:29)    HPI:  89y Female with PMH of hard of hearing, Diabetes mellitus 2, Hypertension, TIAx1, came to the ED s/p Fall. Pt was walking down the stairs and after reaching the bottom step she lost her balance. Pt fell on her left side and complains of head, lower chest, wrist and leg pain. +HT, -LOC, -AC, GCS 15. Pt was not able to get up but was helped up to the chair when help arrived and then brought to the ED. Pt denies chest pain, sob, abd pain, nausea, vomiting, dizziness. Pt is unable to ambulate and ROM is restricted due to pain.  was DX w pelvic FX. pt was evaluated by physiatrist and deemed a good acute rehab candidate can tolerate and benefit from 3hrs of rehab to improve function for safe DC carlo    SUBJECTIVE  No acute overnight events. VSS. Patient seen and evaluated in the gym. Patient reports pain is a little better than yesterday. Remains motivated and continues to participate in therapies.    ROS unchanged from prior    OBJECTIVE    Functional Status: Ambulation: 10 ft w/ RW, ModA      PHYSICAL EXAM    Vital Signs Last 24 Hrs  T(C): 36.1 (11 Aug 2020 05:19), Max: 36.1 (11 Aug 2020 05:19)  T(F): 96.9 (11 Aug 2020 05:19), Max: 96.9 (11 Aug 2020 05:19)  HR: 86 (11 Aug 2020 05:19) (78 - 95)  BP: 135/62 (11 Aug 2020 05:19) (135/62 - 175/80)  BP(mean): --  RR: 18 (11 Aug 2020 05:19) (18 - 18)  SpO2: --    General: NAD,seen in the gym                           HEENT: NC/AT, EOM I, PERRLA, Normal Conjunctivae, ecchymosis left side of neck   Cardio: RRR, Normal S1-S2, No M/G/R                              Pulm: No Respiratory Distress,  Lungs CTAB                        Abdomen: ND/NT, Soft, BS+                                                MSK: No joint swelling, Full ROM, tender left rib cage, tender left hip                                       Ext: No C/C/E, Pulses 2+ throughout, No calf tenderness    Skin:  all skin intact                                                                 Wounds: none  Decubitus Ulcers: None Present     Neurological Examination    Cognitive: AAO x 3                                                                         Attention: Intact   Judgment: Good evidence of judgement                                   Mood/Affect: wnl                                                                           Communication:  Fluent,  No dysarthria   Swallow: intact  CN II - XII  intact  Coordination: FTN/HTS intact                                                                              Sensory: Intact to light touch, PP and Vibration                                                                                             Tone: normal Throughout   Balance: impaired    Motor    LEFT    UE: 4/5  RIGHT UE: 4/5  LEFT    LE:  4-/5  RIGHT LE: 4/5    Reflex:  2 + throughout    acetaminophen   Tablet .. 650 milliGRAM(s) Oral every 6 hours  aluminum hydroxide/magnesium hydroxide/simethicone Suspension 30 milliLiter(s) Oral every 4 hours PRN  aspirin enteric coated 81 milliGRAM(s) Oral daily  dextrose 40% Gel 15 Gram(s) Oral once PRN  dextrose 5%. 1000 milliLiter(s) IV Continuous <Continuous>  dextrose 50% Injectable 12.5 Gram(s) IV Push once  dextrose 50% Injectable 25 Gram(s) IV Push once  dextrose 50% Injectable 25 Gram(s) IV Push once  enoxaparin Injectable 40 milliGRAM(s) SubCutaneous daily  folic acid 1 milliGRAM(s) Oral daily  glucagon  Injectable 1 milliGRAM(s) IntraMuscular once PRN  ibuprofen  Tablet. 400 milliGRAM(s) Oral every 8 hours  lidocaine   Patch 1 Patch Transdermal daily  losartan 25 milliGRAM(s) Oral daily  metFORMIN 500 milliGRAM(s) Oral daily  oxyCODONE    IR 5 milliGRAM(s) Oral every 6 hours PRN  pantoprazole    Tablet 40 milliGRAM(s) Oral before breakfast  polyethylene glycol 3350 17 Gram(s) Oral daily  senna 2 Tablet(s) Oral at bedtime  simvastatin 20 milliGRAM(s) Oral at bedtime      RECENT LABS/IMAGING                        10.0   5.25  )-----------( 242      ( 10 Aug 2020 07:52 )             29.7     08-10    136  |  98  |  17  ----------------------------<  220<H>  4.7   |  27  |  1.1    Ca    9.3      10 Aug 2020 07:52    TPro  7.0  /  Alb  4.2  /  TBili  0.5  /  DBili  x   /  AST  21  /  ALT  21  /  AlkPhos  72  08-10 Patient is a 89y old Female who presents with a chief complaint of rehab of left pelvic FX (04 Aug 2020 13:29)    HPI:  89y Female with PMH of hard of hearing, Diabetes mellitus 2, Hypertension, TIAx1, came to the ED s/p Fall. Pt was walking down the stairs and after reaching the bottom step she lost her balance. Pt fell on her left side and complains of head, lower chest, wrist and leg pain. +HT, -LOC, -AC, GCS 15. Pt was not able to get up but was helped up to the chair when help arrived and then brought to the ED. Pt denies chest pain, sob, abd pain, nausea, vomiting, dizziness. Pt is unable to ambulate and ROM is restricted due to pain.  was DX w pelvic FX. pt was evaluated by physiatrist and deemed a good acute rehab candidate can tolerate and benefit from 3hrs of rehab to improve function for safe DC carlo    SUBJECTIVE  No acute overnight events. VSS. Patient seen and evaluated in the gym. Patient reports pain is a little better than yesterday. Remains motivated and continues to participate in therapies.    ROS unchanged from prior    OBJECTIVE    Functional Status: Ambulation: >30 ft w/ RW, ModA      PHYSICAL EXAM    Vital Signs Last 24 Hrs=AVSS  T(C): 36.1 (11 Aug 2020 05:19), Max: 36.1 (11 Aug 2020 05:19)  T(F): 96.9 (11 Aug 2020 05:19), Max: 96.9 (11 Aug 2020 05:19)  HR: 86 (11 Aug 2020 05:19) (78 - 95)  BP: 135/62 (11 Aug 2020 05:19) (135/62 - 175/80)  BP(mean): --  RR: 18 (11 Aug 2020 05:19) (18 - 18)  SpO2: --on room air    General: NAD,seen in the gym  C/O less pain                         HEENT: NC/AT, EOM I, PERRLA, Normal Conjunctivae, fading ecchymosis left side of neck   Cardio: RRR, Normal S1-S2, No M/G/R                              Pulm: No Respiratory Distress,  Lungs CTAB                        Abdomen: ND/NT, Soft, BS+                                                MSK: No joint swelling, Full ROM, tender left rib cage, tender left hip                                       Ext: No C/C/E, Pulses 2+ throughout, No calf tenderness    Skin:  all skin intact                                                                 Wounds: none  Decubitus Ulcers: None Present     Neurological Examination    Cognitive: AAO x 3                                                                         Attention: Intact   Judgment: Good evidence of judgement                                   Mood/Affect: wnl                                                                           Communication:  Fluent,  No dysarthria   Swallow: intact  CN II - XII  intact  Coordination: FTN/HTS intact                                                                              Sensory: Intact to light touch, PP and Vibration                                                                                             Tone: normal Throughout   Balance: impaired    Motor    LEFT    UE: 4/5  RIGHT UE: 4/5  LEFT    LE:  4-/5  RIGHT LE: 4/5    Reflex:  2 + throughout    acetaminophen   Tablet .. 650 milliGRAM(s) Oral every 6 hours  aluminum hydroxide/magnesium hydroxide/simethicone Suspension 30 milliLiter(s) Oral every 4 hours PRN  aspirin enteric coated 81 milliGRAM(s) Oral daily  dextrose 40% Gel 15 Gram(s) Oral once PRN  dextrose 5%. 1000 milliLiter(s) IV Continuous <Continuous>  dextrose 50% Injectable 12.5 Gram(s) IV Push once  dextrose 50% Injectable 25 Gram(s) IV Push once  dextrose 50% Injectable 25 Gram(s) IV Push once  enoxaparin Injectable 40 milliGRAM(s) SubCutaneous daily  folic acid 1 milliGRAM(s) Oral daily  glucagon  Injectable 1 milliGRAM(s) IntraMuscular once PRN  ibuprofen  Tablet. 400 milliGRAM(s) Oral every 8 hours  lidocaine   Patch 1 Patch Transdermal daily  losartan 25 milliGRAM(s) Oral daily  metFORMIN 500 milliGRAM(s) Oral daily  oxyCODONE    IR 5 milliGRAM(s) Oral every 6 hours PRN  pantoprazole    Tablet 40 milliGRAM(s) Oral before breakfast  polyethylene glycol 3350 17 Gram(s) Oral daily  senna 2 Tablet(s) Oral at bedtime  simvastatin 20 milliGRAM(s) Oral at bedtime      RECENT LABS/IMAGING                        10.0   5.25  )-----------( 242      ( 10 Aug 2020 07:52 )             29.7     08-10    136  |  98  |  17  ----------------------------<  220<H>  4.7   |  27  |  1.1    Ca    9.3      10 Aug 2020 07:52    TPro  7.0  /  Alb  4.2  /  TBili  0.5  /  DBili  x   /  AST  21  /  ALT  21  /  AlkPhos  72  08-10      anemia

## 2020-08-11 NOTE — CHART NOTE - NSCHARTNOTEFT_GEN_A_CORE
Registered Dietitian Follow-Up     Patient Profile Reviewed                           Yes [x]   No []     Nutrition History Previously Obtained        Yes [x]  No []       Pertinent Subjective Information: Pt and daughter at bedside both report improved appetite; consuming 75% of meals+ supplement at this time.      Pertinent Medical Interventions: Rehab of left pelvic pubic rami fx post fall. anemia stable. DM II on oral supplement.      Diet order: consistent carb no snacks, Glucerna shake BID     Anthropometrics:  - Ht. 63"  - Wt. 109 lbs (8/5; dosing wt)- no new wts doc since initial RD assessment   - %wt change  - BMI 19.3  - IBW 52 kg     Pertinent Lab Data: 8/10: RBC-2.94, H/H- 10/29.7, glucose serum- 220, POCT BG- 184, 126, 132, 129; 8/11: POCT BG- 121, 139;  8/1: [id number]     Pertinent Meds: Lovenox, metformin, aluminum hydroxide, folic acid, losartan, protonix, miralax, senna, simvastatin     Physical Findings:  - Appearance: AAOx3; no edema noted  - GI function: no GI issues noted, LBM 8/11  - Tubes: none  - Oral/Mouth cavity: no chewing/swallowing difficulties noted   - Skin: intact     Nutrition Requirements  Weight Used:  lbs-- continue from initial RD assessment 8/5     Estimated Energy Needs    Continue [x]  Adjust []  1040- 1127 kcal/day (MSJ x 1.2-1.3 AF)     Estimated Protein Needs    Continue [x]  Adjust []  50-60 g/day (1-1.2 g/kg CBW)     Estimated Fluid Needs        Continue [x]  Adjust []  1ml/kcal or per LIP    Nutrient Intake: meeting estimated energy needs      [x] Previous Nutrition Diagnosis: Inadequate oral intake- improved              [] Ongoing          [x] Resolved    [x] No active nutrition diagnosis identified at this time     Nutrition Intervention meals and snacks      Goal/Expected Outcome: Pt to continue to consume 75% of meals+ supplement in 7 days     Indicator/Monitoring: RD monitor energy intake, body comp, NFPF, glucose profile, anemia profile.    Recommendations: Continue current diet order as rec.

## 2020-08-12 LAB
GLUCOSE BLDC GLUCOMTR-MCNC: 102 MG/DL — HIGH (ref 70–99)
GLUCOSE BLDC GLUCOMTR-MCNC: 116 MG/DL — HIGH (ref 70–99)
GLUCOSE BLDC GLUCOMTR-MCNC: 144 MG/DL — HIGH (ref 70–99)
GLUCOSE BLDC GLUCOMTR-MCNC: 154 MG/DL — HIGH (ref 70–99)

## 2020-08-12 RX ORDER — OXYCODONE HYDROCHLORIDE 5 MG/1
5 TABLET ORAL EVERY 6 HOURS
Refills: 0 | Status: DISCONTINUED | OUTPATIENT
Start: 2020-08-12 | End: 2020-08-14

## 2020-08-12 RX ADMIN — ENOXAPARIN SODIUM 40 MILLIGRAM(S): 100 INJECTION SUBCUTANEOUS at 12:49

## 2020-08-12 RX ADMIN — Medication 650 MILLIGRAM(S): at 05:32

## 2020-08-12 RX ADMIN — SENNA PLUS 2 TABLET(S): 8.6 TABLET ORAL at 21:22

## 2020-08-12 RX ADMIN — Medication 400 MILLIGRAM(S): at 21:22

## 2020-08-12 RX ADMIN — Medication 650 MILLIGRAM(S): at 00:48

## 2020-08-12 RX ADMIN — Medication 650 MILLIGRAM(S): at 06:08

## 2020-08-12 RX ADMIN — Medication 81 MILLIGRAM(S): at 12:47

## 2020-08-12 RX ADMIN — Medication 650 MILLIGRAM(S): at 12:47

## 2020-08-12 RX ADMIN — SIMVASTATIN 20 MILLIGRAM(S): 20 TABLET, FILM COATED ORAL at 21:22

## 2020-08-12 RX ADMIN — METFORMIN HYDROCHLORIDE 500 MILLIGRAM(S): 850 TABLET ORAL at 12:47

## 2020-08-12 RX ADMIN — Medication 1 MILLIGRAM(S): at 12:47

## 2020-08-12 RX ADMIN — Medication 400 MILLIGRAM(S): at 22:15

## 2020-08-12 RX ADMIN — PANTOPRAZOLE SODIUM 40 MILLIGRAM(S): 20 TABLET, DELAYED RELEASE ORAL at 06:08

## 2020-08-12 RX ADMIN — LIDOCAINE 1 PATCH: 4 CREAM TOPICAL at 21:23

## 2020-08-12 RX ADMIN — LOSARTAN POTASSIUM 25 MILLIGRAM(S): 100 TABLET, FILM COATED ORAL at 05:33

## 2020-08-12 RX ADMIN — Medication 400 MILLIGRAM(S): at 06:08

## 2020-08-12 RX ADMIN — Medication 650 MILLIGRAM(S): at 17:34

## 2020-08-12 RX ADMIN — LIDOCAINE 1 PATCH: 4 CREAM TOPICAL at 00:48

## 2020-08-12 RX ADMIN — Medication 400 MILLIGRAM(S): at 13:15

## 2020-08-12 RX ADMIN — Medication 400 MILLIGRAM(S): at 12:47

## 2020-08-12 RX ADMIN — Medication 650 MILLIGRAM(S): at 14:15

## 2020-08-12 RX ADMIN — Medication 400 MILLIGRAM(S): at 05:33

## 2020-08-12 RX ADMIN — POLYETHYLENE GLYCOL 3350 17 GRAM(S): 17 POWDER, FOR SOLUTION ORAL at 12:48

## 2020-08-12 RX ADMIN — LIDOCAINE 1 PATCH: 4 CREAM TOPICAL at 12:47

## 2020-08-12 RX ADMIN — Medication 650 MILLIGRAM(S): at 23:17

## 2020-08-12 NOTE — PROGRESS NOTE ADULT - SUBJECTIVE AND OBJECTIVE BOX
Patient is a 89y old  Female who presents with a chief complaint of rehab of left pelvic FX (11 Aug 2020 09:59)    HPI:  89y Female with PMH of hard of hearing, Diabetes mellitus, Hypertension, TIAx1, came to the ED s/p Fall. Pt was walking down the stairs and after reaching the bottom step she lost her balance. Pt fell on her left side and complains of head, lower chest, wrist and leg pain. +HT, -LOC, -AC, GCS 15. Pt was not able to get up but was helped up to the chair when help arrived and then brought to the ED. Pt denies chest pain, sob, abd pain, nausea, vomiting, dizziness. Pt is unable to ambulate and ROM is restricted due to pain. Was DX w pelvic FX. pt was evaluated by physiatrist and deemed a good acute rehab candidate can tolerate and benefit from 3hrs of rehab to improve function for safe DC home    SUBJECTIVE  No acute overnight events. VSS. Patient seen and evaluated in the gym. Patient reports pain is a little better than yesterday. Remains motivated and continues to participate in therapies.    ROS unchanged from prior    OBJECTIVE    Functional Status: Ambulation: 40 ft w/ RW Steady Assist, 4 steps with 2 rails    PHYSICAL EXAM    Vital Signs Last 24 Hrs  T(C): 35.8 (12 Aug 2020 05:15), Max: 36.3 (11 Aug 2020 14:00)  T(F): 96.5 (12 Aug 2020 05:15), Max: 97.3 (11 Aug 2020 14:00)  HR: 93 (12 Aug 2020 05:15) (77 - 93)  BP: 180/74 (12 Aug 2020 05:15) (139/86 - 180/74)  BP(mean): --  RR: 18 (12 Aug 2020 05:15) (18 - 18)  SpO2: --    General: NAD,seen in the gym  C/O less pain                         HEENT: NC/AT, EOM I, PERRLA, Normal Conjunctivae, fading ecchymosis left side of neck   Cardio: RRR, Normal S1-S2, No M/G/R                              Pulm: No Respiratory Distress,  Lungs CTAB                        Abdomen: ND/NT, Soft, BS+                                                MSK: No joint swelling, Full ROM, tender left rib cage, tender left hip                                       Ext: No C/C/E, Pulses 2+ throughout, No calf tenderness    Skin:  all skin intact                                                                 Wounds: none  Decubitus Ulcers: None Present     Neurological Examination    Cognitive: AAO x 3                                                                         Attention: Intact   Judgment: Good evidence of judgement                                   Mood/Affect: wnl                                                                           Communication:  Fluent,  No dysarthria   Swallow: intact  CN II - XII  intact  Coordination: FTN/HTS intact                                                                              Sensory: Intact to light touch, PP and Vibration                                                                                             Tone: normal Throughout   Balance: impaired    Motor    LEFT    UE: 4/5  RIGHT UE: 4/5  LEFT    LE:  4-/5  RIGHT LE: 4/5    Reflex:  2 + throughout      acetaminophen   Tablet .. 650 milliGRAM(s) Oral every 6 hours  aluminum hydroxide/magnesium hydroxide/simethicone Suspension 30 milliLiter(s) Oral every 4 hours PRN  aspirin enteric coated 81 milliGRAM(s) Oral daily  dextrose 40% Gel 15 Gram(s) Oral once PRN  dextrose 5%. 1000 milliLiter(s) IV Continuous <Continuous>  dextrose 50% Injectable 12.5 Gram(s) IV Push once  dextrose 50% Injectable 25 Gram(s) IV Push once  dextrose 50% Injectable 25 Gram(s) IV Push once  enoxaparin Injectable 40 milliGRAM(s) SubCutaneous daily  folic acid 1 milliGRAM(s) Oral daily  glucagon  Injectable 1 milliGRAM(s) IntraMuscular once PRN  ibuprofen  Tablet. 400 milliGRAM(s) Oral every 8 hours  lidocaine   Patch 1 Patch Transdermal daily  losartan 25 milliGRAM(s) Oral daily  metFORMIN 500 milliGRAM(s) Oral daily  oxyCODONE    IR 5 milliGRAM(s) Oral every 6 hours PRN  pantoprazole    Tablet 40 milliGRAM(s) Oral before breakfast  polyethylene glycol 3350 17 Gram(s) Oral daily  senna 2 Tablet(s) Oral at bedtime  simvastatin 20 milliGRAM(s) Oral at bedtime        RECENT LABS/IMAGING                        10.0   5.25  )-----------( 242      ( 10 Aug 2020 07:52 )             29.7     08-10    136  |  98  |  17  ----------------------------<  220<H>  4.7   |  27  |  1.1    Ca    9.3      10 Aug 2020 07:52    TPro  7.0  /  Alb  4.2  /  TBili  0.5  /  DBili  x   /  AST  21  /  ALT  21  /  AlkPhos  72  08-10      anemia Patient is a 89y old  Female who presents with a chief complaint of rehab of left pelvic FX (11 Aug 2020 09:59)    HPI:  89y Female with PMH of hard of hearing, Diabetes mellitus, Hypertension, TIAx1, came to the ED s/p Fall. Pt was walking down the stairs and after reaching the bottom step she lost her balance. Pt fell on her left side and complains of head, lower chest, wrist and leg pain. +HT, -LOC, -AC, GCS 15. Pt was not able to get up but was helped up to the chair when help arrived and then brought to the ED. Pt denies chest pain, sob, abd pain, nausea, vomiting, dizziness. Pt is unable to ambulate and ROM is restricted due to pain. Was DX w pelvic FX. pt was evaluated by physiatrist and deemed a good acute rehab candidate can tolerate and benefit from 3hrs of rehab to improve function for safe DC home    SUBJECTIVE  No acute overnight events. VSS. Patient reports some moderate left pelvic pain which was addressed Remains motivated and continues to participate in therapies.   Scheduled to be discharged home on Friday.    ROS unchanged from prior    OBJECTIVE    Functional Status: Ambulation: 40 ft w/ RW Steady Assist, 4 steps with 2 rails    PHYSICAL EXAM    Vital Signs Last 24 Hrs  T(C): 35.8 (12 Aug 2020 05:15), Max: 36.3 (11 Aug 2020 14:00)  T(F): 96.5 (12 Aug 2020 05:15), Max: 97.3 (11 Aug 2020 14:00)  HR: 93 (12 Aug 2020 05:15) (77 - 93)  BP: 180/74 (12 Aug 2020 05:15) (139/86 - 180/74)  BP(mean): --  RR: 18 (12 Aug 2020 05:15) (18 - 18)  SpO2: --    General: NAD,seen in the gym  C/O less pain                         HEENT: NC/AT, EOM I, PERRLA, Normal Conjunctivae, fading ecchymosis left side of neck   Cardio: RRR, Normal S1-S2, No M/G/R                              Pulm: No Respiratory Distress,  Lungs CTAB                        Abdomen: ND/NT, Soft, BS+                                                MSK: No joint swelling, Full ROM, tender left rib cage, tender left hip                                       Ext: No C/C/E, Pulses 2+ throughout, No calf tenderness    Skin:  all skin intact                                                                 Wounds: none  Decubitus Ulcers: None Present     Neurological Examination    Cognitive: AAO x 3                                                                         Attention: Intact   Judgment: Good evidence of judgement                                   Mood/Affect: wnl                                                                           Communication:  Fluent,  No dysarthria   Swallow: intact  CN II - XII  intact  Coordination: FTN/HTS intact                                                                              Sensory: Intact to light touch, PP and Vibration                                                                                             Tone: normal Throughout   Balance: impaired    Motor    LEFT    UE: 4/5  RIGHT UE: 4/5  LEFT    LE:  4-/5  RIGHT LE: 4/5    Reflex:  2 + throughout      acetaminophen   Tablet .. 650 milliGRAM(s) Oral every 6 hours  aluminum hydroxide/magnesium hydroxide/simethicone Suspension 30 milliLiter(s) Oral every 4 hours PRN  aspirin enteric coated 81 milliGRAM(s) Oral daily  dextrose 40% Gel 15 Gram(s) Oral once PRN  dextrose 5%. 1000 milliLiter(s) IV Continuous <Continuous>  dextrose 50% Injectable 12.5 Gram(s) IV Push once  dextrose 50% Injectable 25 Gram(s) IV Push once  dextrose 50% Injectable 25 Gram(s) IV Push once  enoxaparin Injectable 40 milliGRAM(s) SubCutaneous daily  folic acid 1 milliGRAM(s) Oral daily  glucagon  Injectable 1 milliGRAM(s) IntraMuscular once PRN  ibuprofen  Tablet. 400 milliGRAM(s) Oral every 8 hours  lidocaine   Patch 1 Patch Transdermal daily  losartan 25 milliGRAM(s) Oral daily  metFORMIN 500 milliGRAM(s) Oral daily  oxyCODONE    IR 5 milliGRAM(s) Oral every 6 hours PRN  pantoprazole    Tablet 40 milliGRAM(s) Oral before breakfast  polyethylene glycol 3350 17 Gram(s) Oral daily  senna 2 Tablet(s) Oral at bedtime  simvastatin 20 milliGRAM(s) Oral at bedtime        RECENT LABS/IMAGING                        10.0   5.25  )-----------( 242      ( 10 Aug 2020 07:52 )             29.7     08-10    136  |  98  |  17  ----------------------------<  220<H>  4.7   |  27  |  1.1    Ca    9.3      10 Aug 2020 07:52    TPro  7.0  /  Alb  4.2  /  TBili  0.5  /  DBili  x   /  AST  21  /  ALT  21  /  AlkPhos  72  08-10      anemia Patient is a 89y old  Female who presents with a chief complaint of rehab of left pelvic FX (11 Aug 2020 09:59)    HPI:  89y Female with PMH of hard of hearing, Diabetes mellitus, Hypertension, TIAx1, came to the ED s/p Fall. Pt was walking down the stairs and after reaching the bottom step she lost her balance. Pt fell on her left side and complains of head, lower chest, wrist and leg pain. +HT, -LOC, -AC, GCS 15. Pt was not able to get up but was helped up to the chair when help arrived and then brought to the ED. Pt denies chest pain, sob, abd pain, nausea, vomiting, dizziness. Pt is unable to ambulate and ROM is restricted due to pain. Was DX w pelvic FX. pt was evaluated by physiatrist and deemed a good acute rehab candidate can tolerate and benefit from 3hrs of rehab to improve function for safe DC home    SUBJECTIVE  No acute overnight events. VSS. Patient reports some moderate left pelvic pain which was addressed Remains motivated and continues to participate in therapies.   Scheduled to be discharged home on Friday.    ROS unchanged from prior    OBJECTIVE    Functional Status: Ambulation: 40 ft w/ RW Steady Assist, 4 steps with 2 rails    PHYSICAL EXAM    Vital Signs Last 24 Hrs  T(C): 35.8 (12 Aug 2020 05:15), Max: 36.3 (11 Aug 2020 14:00)  T(F): 96.5 (12 Aug 2020 05:15), Max: 97.3 (11 Aug 2020 14:00)  HR: 93 (12 Aug 2020 05:15) (77 - 93)  BP: 180/74 (12 Aug 2020 05:15) (139/86 - 180/74)  BP(mean): --  RR: 18 (12 Aug 2020 05:15) (18 - 18)  SpO2: --    General: NAD,seen in her room  still w pain                         HEENT: NC/AT, EOM I, PERRLA, Normal Conjunctivae, fading ecchymosis left side of neck   Cardio: RRR, Normal S1-S2, No M/G/R                              Pulm: No Respiratory Distress,  Lungs CTAB                        Abdomen: ND/NT, Soft, BS+                                                MSK: No joint swelling, Full ROM, tender left rib cage, tender left hip                                       Ext: No C/C/E, Pulses 2+ throughout, No calf tenderness    Skin:  all skin intact                                                                 Wounds: none  Decubitus Ulcers: None Present     Neurological Examination    Cognitive: AAO x 3                                                                         Attention: Intact   Judgment: Good evidence of judgement                                   Mood/Affect: wnl                                                                           Communication:  Fluent,  No dysarthria   Swallow: intact  CN II - XII  intact  Coordination: FTN/HTS intact                                                                              Sensory: Intact to light touch, PP and Vibration                                                                                             Tone: normal Throughout   Balance: impaired    Motor    LEFT    UE: 4/5  RIGHT UE: 4/5  LEFT    LE:  4-/5  RIGHT LE: 4/5    Reflex:  2 + throughout      acetaminophen   Tablet .. 650 milliGRAM(s) Oral every 6 hours  aluminum hydroxide/magnesium hydroxide/simethicone Suspension 30 milliLiter(s) Oral every 4 hours PRN  aspirin enteric coated 81 milliGRAM(s) Oral daily  dextrose 40% Gel 15 Gram(s) Oral once PRN  dextrose 5%. 1000 milliLiter(s) IV Continuous <Continuous>  dextrose 50% Injectable 12.5 Gram(s) IV Push once  dextrose 50% Injectable 25 Gram(s) IV Push once  dextrose 50% Injectable 25 Gram(s) IV Push once  enoxaparin Injectable 40 milliGRAM(s) SubCutaneous daily  folic acid 1 milliGRAM(s) Oral daily  glucagon  Injectable 1 milliGRAM(s) IntraMuscular once PRN  ibuprofen  Tablet. 400 milliGRAM(s) Oral every 8 hours  lidocaine   Patch 1 Patch Transdermal daily  losartan 25 milliGRAM(s) Oral daily  metFORMIN 500 milliGRAM(s) Oral daily  oxyCODONE    IR 5 milliGRAM(s) Oral every 6 hours PRN  pantoprazole    Tablet 40 milliGRAM(s) Oral before breakfast  polyethylene glycol 3350 17 Gram(s) Oral daily  senna 2 Tablet(s) Oral at bedtime  simvastatin 20 milliGRAM(s) Oral at bedtime        RECENT LABS/IMAGING                        10.0   5.25  )-----------( 242      ( 10 Aug 2020 07:52 )             29.7     08-10    136  |  98  |  17  ----------------------------<  220<H>  4.7   |  27  |  1.1    Ca    9.3      10 Aug 2020 07:52    TPro  7.0  /  Alb  4.2  /  TBili  0.5  /  DBili  x   /  AST  21  /  ALT  21  /  AlkPhos  72  08-10      anemia

## 2020-08-12 NOTE — PROGRESS NOTE ADULT - ASSESSMENT
89 yr old [place] female left pelvic pubic rami fx post fall  continue 3 hrs of acute rehab  DC this week     Comorbidities MEDICAL ISSUES:  #anemia - Hgb 10.0, stable  - folic acid wnl, b12 wnl, iron studies pending, hemoccult stools pending  #DM2   - metformin  #HTN   - losartan  #HLD TIA   - zocor, asa 81mg     -Pain control: Tylenol, ibuprofen, oxycodone PRN, lidoderm patch    -GI/Bowel Mgmt -  miralax, protonix, maalox or mylanta    -Bladder management: voids well    - DVT: Lovenox, TEDs     #Skin:  -No active issues at this time    FEN   Diet -  diabetic dash   [place] =speak louder as needed or pocket talker    osteoporosis by FX check vit D, add calcium  Precautions / PROPHYLAXIS:      - Falls, Cardiac    - Ortho: Weight bearing status: wbat    Patient was seen and discussed with my attending, Dr. Portillo

## 2020-08-13 LAB
GLUCOSE BLDC GLUCOMTR-MCNC: 120 MG/DL — HIGH (ref 70–99)
GLUCOSE BLDC GLUCOMTR-MCNC: 123 MG/DL — HIGH (ref 70–99)

## 2020-08-13 RX ADMIN — LIDOCAINE 1 PATCH: 4 CREAM TOPICAL at 19:40

## 2020-08-13 RX ADMIN — SIMVASTATIN 20 MILLIGRAM(S): 20 TABLET, FILM COATED ORAL at 21:21

## 2020-08-13 RX ADMIN — LIDOCAINE 1 PATCH: 4 CREAM TOPICAL at 13:00

## 2020-08-13 RX ADMIN — Medication 650 MILLIGRAM(S): at 12:59

## 2020-08-13 RX ADMIN — Medication 650 MILLIGRAM(S): at 21:22

## 2020-08-13 RX ADMIN — Medication 400 MILLIGRAM(S): at 06:14

## 2020-08-13 RX ADMIN — METFORMIN HYDROCHLORIDE 500 MILLIGRAM(S): 850 TABLET ORAL at 13:01

## 2020-08-13 RX ADMIN — Medication 650 MILLIGRAM(S): at 19:40

## 2020-08-13 RX ADMIN — Medication 650 MILLIGRAM(S): at 06:14

## 2020-08-13 RX ADMIN — OXYCODONE HYDROCHLORIDE 5 MILLIGRAM(S): 5 TABLET ORAL at 08:59

## 2020-08-13 RX ADMIN — Medication 650 MILLIGRAM(S): at 05:09

## 2020-08-13 RX ADMIN — Medication 650 MILLIGRAM(S): at 17:49

## 2020-08-13 RX ADMIN — SENNA PLUS 2 TABLET(S): 8.6 TABLET ORAL at 21:21

## 2020-08-13 RX ADMIN — Medication 400 MILLIGRAM(S): at 14:04

## 2020-08-13 RX ADMIN — PANTOPRAZOLE SODIUM 40 MILLIGRAM(S): 20 TABLET, DELAYED RELEASE ORAL at 05:09

## 2020-08-13 RX ADMIN — Medication 650 MILLIGRAM(S): at 00:00

## 2020-08-13 RX ADMIN — Medication 400 MILLIGRAM(S): at 05:09

## 2020-08-13 RX ADMIN — Medication 400 MILLIGRAM(S): at 21:21

## 2020-08-13 RX ADMIN — Medication 81 MILLIGRAM(S): at 12:59

## 2020-08-13 RX ADMIN — LOSARTAN POTASSIUM 25 MILLIGRAM(S): 100 TABLET, FILM COATED ORAL at 05:09

## 2020-08-13 RX ADMIN — LIDOCAINE 1 PATCH: 4 CREAM TOPICAL at 00:00

## 2020-08-13 RX ADMIN — Medication 650 MILLIGRAM(S): at 14:04

## 2020-08-13 RX ADMIN — ENOXAPARIN SODIUM 40 MILLIGRAM(S): 100 INJECTION SUBCUTANEOUS at 13:00

## 2020-08-13 RX ADMIN — Medication 400 MILLIGRAM(S): at 17:45

## 2020-08-13 RX ADMIN — OXYCODONE HYDROCHLORIDE 5 MILLIGRAM(S): 5 TABLET ORAL at 11:41

## 2020-08-13 RX ADMIN — Medication 1 MILLIGRAM(S): at 12:59

## 2020-08-13 NOTE — PROGRESS NOTE ADULT - SUBJECTIVE AND OBJECTIVE BOX
Patient is a 89y old  Female who presents with a chief complaint of rehab of left pelvic FX (11 Aug 2020 09:59)    HPI:  89y Female with PMH of hard of hearing, Diabetes mellitus, Hypertension, TIAx1, came to the ED s/p Fall. Pt was walking down the stairs and after reaching the bottom step she lost her balance. Pt fell on her left side and complains of head, lower chest, wrist and leg pain. +HT, -LOC, -AC, GCS 15. Pt was not able to get up but was helped up to the chair when help arrived and then brought to the ED. Pt denies chest pain, sob, abd pain, nausea, vomiting, dizziness. Pt is unable to ambulate and ROM is restricted due to pain. Was DX w pelvic FX. pt was evaluated by physiatrist and deemed a good acute rehab candidate can tolerate and benefit from 3hrs of rehab to improve function for safe DC home    SUBJECTIVE  No acute overnight events. VSS. Patient reports some moderate left pelvic pain, improved from yesterday. She is scheduled to be discharged to home tomorrow.    ROS unchanged from prior    OBJECTIVE    Functional Status: Ambulation: 40 ft w/ RW Steady Assist, 4 steps with 2 rails      PHYSICAL EXAM    Vital Signs Last 24 Hrs  T(C): 35.6 (13 Aug 2020 04:52), Max: 35.9 (12 Aug 2020 21:21)  T(F): 96.1 (13 Aug 2020 04:52), Max: 96.6 (12 Aug 2020 21:21)  HR: 69 (12 Aug 2020 21:21) (69 - 82)  BP: 130/68 (13 Aug 2020 04:52) (114/56 - 167/71)  BP(mean): --  RR: 18 (13 Aug 2020 04:52) (18 - 18)  SpO2: --  General: NAD,seen in her room  still w pain                         HEENT: NC/AT, EOM I, PERRLA, Normal Conjunctivae, fading ecchymosis left side of neck   Cardio: RRR, Normal S1-S2, No M/G/R                              Pulm: No Respiratory Distress,  Lungs CTAB                        Abdomen: ND/NT, Soft, BS+                                                MSK: No joint swelling, Full ROM, tender left rib cage, tender left hip                                       Ext: No C/C/E, Pulses 2+ throughout, No calf tenderness    Skin:  all skin intact                                                                 Wounds: none  Decubitus Ulcers: None Present     Neurological Examination    Cognitive: AAO x 3                                                                         Attention: Intact   Judgment: Good evidence of judgement                                   Mood/Affect: wnl                                                                           Communication:  Fluent,  No dysarthria   Swallow: intact  CN II - XII  intact  Coordination: FTN/HTS intact                                                                              Sensory: Intact to light touch, PP and Vibration                                                                                             Tone: normal Throughout   Balance: impaired    Motor    LEFT    UE: 4/5  RIGHT UE: 4/5  LEFT    LE:  4-/5  RIGHT LE: 4/5    Reflex:  2 + throughout    acetaminophen   Tablet .. 650 milliGRAM(s) Oral every 6 hours  aluminum hydroxide/magnesium hydroxide/simethicone Suspension 30 milliLiter(s) Oral every 4 hours PRN  aspirin enteric coated 81 milliGRAM(s) Oral daily  dextrose 40% Gel 15 Gram(s) Oral once PRN  dextrose 5%. 1000 milliLiter(s) IV Continuous <Continuous>  dextrose 50% Injectable 12.5 Gram(s) IV Push once  dextrose 50% Injectable 25 Gram(s) IV Push once  dextrose 50% Injectable 25 Gram(s) IV Push once  enoxaparin Injectable 40 milliGRAM(s) SubCutaneous daily  folic acid 1 milliGRAM(s) Oral daily  glucagon  Injectable 1 milliGRAM(s) IntraMuscular once PRN  ibuprofen  Tablet. 400 milliGRAM(s) Oral every 8 hours  lidocaine   Patch 1 Patch Transdermal daily  losartan 25 milliGRAM(s) Oral daily  metFORMIN 500 milliGRAM(s) Oral daily  oxyCODONE    IR 5 milliGRAM(s) Oral every 6 hours PRN  pantoprazole    Tablet 40 milliGRAM(s) Oral before breakfast  polyethylene glycol 3350 17 Gram(s) Oral daily  senna 2 Tablet(s) Oral at bedtime  simvastatin 20 milliGRAM(s) Oral at bedtime      RECENT LABS/IMAGING                        10.0   5.25  )-----------( 242      ( 10 Aug 2020 07:52 )             29.7     08-10    136  |  98  |  17  ----------------------------<  220<H>  4.7   |  27  |  1.1    Ca    9.3      10 Aug 2020 07:52    TPro  7.0  /  Alb  4.2  /  TBili  0.5  /  DBili  x   /  AST  21  /  ALT  21  /  AlkPhos  72  08-10      anemia Patient is a 89y old  Female who presents with a chief complaint of rehab of left pelvic FX (11 Aug 2020 09:59)    HPI:  89y Female with PMH of hard of hearing, Diabetes mellitus, Hypertension, TIAx1, came to the ED s/p Fall. Pt was walking down the stairs and after reaching the bottom step she lost her balance. Pt fell on her left side and complains of head, lower chest, wrist and leg pain. +HT, -LOC, -AC, GCS 15. Pt was not able to get up but was helped up to the chair when help arrived and then brought to the ED. Pt denies chest pain, sob, abd pain, nausea, vomiting, dizziness. Pt is unable to ambulate and ROM is restricted due to pain. Was DX w pelvic FX. pt was evaluated by physiatrist and deemed a good acute rehab candidate can tolerate and benefit from 3hrs of rehab to improve function for safe DC home    SUBJECTIVE  No acute overnight events. VSS. Patient reports some moderate left pelvic pain, improved from yesterday. She is scheduled to be discharged to home tomorrow.    ROS unchanged from prior    OBJECTIVE    Functional Status: Ambulation: 40 ft w/ RW Steady Assist, 4 steps with 2 rails      PHYSICAL EXAM    Vital Signs Last 24 Hrs=AVSS  T(C): 35.6 (13 Aug 2020 04:52), Max: 35.9 (12 Aug 2020 21:21)  T(F): 96.1 (13 Aug 2020 04:52), Max: 96.6 (12 Aug 2020 21:21)  HR: 69 (12 Aug 2020 21:21) (69 - 82)  BP: 130/68 (13 Aug 2020 04:52) (114/56 - 167/71)  BP(mean): --  RR: 18 (13 Aug 2020 04:52) (18 - 18)  SpO2: --on room air    General: NAD,seen in the GYM daughter present, less pain today                        HEENT: NC/AT, EOM I, PERRLA, Normal Conjunctivae, fading ecchymosis left side of neck   Cardio: RRR, Normal S1-S2, No M/G/R                              Pulm: No Respiratory Distress,  Lungs CTAB                        Abdomen: ND/NT, Soft, BS+                                                MSK: No joint swelling, Full ROM, tender left rib cage, tender left hip                                       Ext: No C/C/E, Pulses 2+ throughout, No calf tenderness    Skin:  all skin intact                                                                 Wounds: none  Decubitus Ulcers: None Present     Neurological Examination    Cognitive: AAO x 3                                                                         Attention: Intact   Judgment: Good evidence of judgement                                   Mood/Affect: wnl                                                                           Communication:  Fluent,  No dysarthria   Swallow: intact  CN II - XII  intact  Coordination: FTN/HTS intact                                                                              Sensory: Intact to light touch, PP and Vibration                                                                                             Tone: normal Throughout   Balance: impaired    Motor    LEFT    UE: 4/5  RIGHT UE: 4/5  LEFT    LE:  4-/5  RIGHT LE: 4/5    Reflex:  2 + throughout    acetaminophen   Tablet .. 650 milliGRAM(s) Oral every 6 hours  aluminum hydroxide/magnesium hydroxide/simethicone Suspension 30 milliLiter(s) Oral every 4 hours PRN  aspirin enteric coated 81 milliGRAM(s) Oral daily  dextrose 40% Gel 15 Gram(s) Oral once PRN  dextrose 5%. 1000 milliLiter(s) IV Continuous <Continuous>  dextrose 50% Injectable 12.5 Gram(s) IV Push once  dextrose 50% Injectable 25 Gram(s) IV Push once  dextrose 50% Injectable 25 Gram(s) IV Push once  enoxaparin Injectable 40 milliGRAM(s) SubCutaneous daily  folic acid 1 milliGRAM(s) Oral daily  glucagon  Injectable 1 milliGRAM(s) IntraMuscular once PRN  ibuprofen  Tablet. 400 milliGRAM(s) Oral every 8 hours  lidocaine   Patch 1 Patch Transdermal daily  losartan 25 milliGRAM(s) Oral daily  metFORMIN 500 milliGRAM(s) Oral daily  oxyCODONE    IR 5 milliGRAM(s) Oral every 6 hours PRN  pantoprazole    Tablet 40 milliGRAM(s) Oral before breakfast  polyethylene glycol 3350 17 Gram(s) Oral daily  senna 2 Tablet(s) Oral at bedtime  simvastatin 20 milliGRAM(s) Oral at bedtime      RECENT LABS/IMAGING                        10.0   5.25  )-----------( 242      ( 10 Aug 2020 07:52 )             29.7     08-10    136  |  98  |  17  ----------------------------<  220<H>  4.7   |  27  |  1.1    Ca    9.3      10 Aug 2020 07:52    TPro  7.0  /  Alb  4.2  /  TBili  0.5  /  DBili  x   /  AST  21  /  ALT  21  /  AlkPhos  72  08-10      anemia

## 2020-08-13 NOTE — PROGRESS NOTE ADULT - ASSESSMENT
89 yr old [place] female left pelvic pubic rami fx post fall  continue 3 hrs of acute rehab  DC tomorrow     Comorbidities MEDICAL ISSUES:  #anemia - Hgb 10.0, stable  - folic acid wnl, b12 wnl  #DM2   - metformin  #HTN   - losartan  #HLD TIA   - zocor, asa 81mg     -Pain control: Tylenol, ibuprofen, oxycodone PRN, lidoderm patch    -GI/Bowel Mgmt -  miralax, protonix, maalox or mylanta    -Bladder management: voids well    - DVT: Lovenox, TEDs     #Skin:  -No active issues at this time    FEN   Diet -  diabetic dash   [place] =speak louder as needed or pocket talker    osteoporosis by FX check vit D, add calcium  Precautions / PROPHYLAXIS:      - Falls, Cardiac    - Ortho: Weight bearing status: wbat    Patient was seen and discussed with my attending, Dr. Portillo 89 yr old [place] female left pelvic pubic rami fx post fall  continue 3 hrs of acute rehab  DC tomorrow     Comorbidities MEDICAL ISSUES:  #anemia - Hgb 10.0, stable  - folic acid wnl, b12 wnl  #DM2   - metformin  #HTN   - losartan  #HLD TIA   - zocor, asa 81mg     -Pain control: Tylenol, ibuprofen, oxycodone PRN, lidoderm patch    -GI/Bowel Mgmt -  miralax, protonix, maalox or mylanta    -Bladder management: voids well    - DVT: Lovenox, TEDs     #Skin:  -No active issues at this time    FEN   Diet -  diabetic dash   [place] =speak louder as needed or pocket talker    osteoporosis by FX vit D level 37,,  calcium  Precautions / PROPHYLAXIS:      - Falls, Cardiac    - Ortho: Weight bearing status: wbat    Patient was seen and discussed with my attending, Dr. Portillo

## 2020-08-14 ENCOUNTER — TRANSCRIPTION ENCOUNTER (OUTPATIENT)
Age: 85
End: 2020-08-14

## 2020-08-14 VITALS
HEART RATE: 90 BPM | TEMPERATURE: 96 F | SYSTOLIC BLOOD PRESSURE: 115 MMHG | RESPIRATION RATE: 18 BRPM | DIASTOLIC BLOOD PRESSURE: 78 MMHG

## 2020-08-14 LAB
GLUCOSE BLDC GLUCOMTR-MCNC: 128 MG/DL — HIGH (ref 70–99)
GLUCOSE BLDC GLUCOMTR-MCNC: 199 MG/DL — HIGH (ref 70–99)

## 2020-08-14 RX ORDER — LIDOCAINE 4 G/100G
1 CREAM TOPICAL
Qty: 30 | Refills: 0
Start: 2020-08-14 | End: 2020-09-12

## 2020-08-14 RX ORDER — OXYCODONE HYDROCHLORIDE 5 MG/1
1 TABLET ORAL
Qty: 25 | Refills: 0
Start: 2020-08-14 | End: 2020-08-19

## 2020-08-14 RX ORDER — ASPIRIN/CALCIUM CARB/MAGNESIUM 324 MG
1 TABLET ORAL
Qty: 0 | Refills: 0 | DISCHARGE
Start: 2020-08-14

## 2020-08-14 RX ORDER — FOLIC ACID 0.8 MG
1 TABLET ORAL
Qty: 30 | Refills: 1
Start: 2020-08-14 | End: 2020-10-12

## 2020-08-14 RX ORDER — OMEPRAZOLE 10 MG/1
1 CAPSULE, DELAYED RELEASE ORAL
Qty: 30 | Refills: 0
Start: 2020-08-14 | End: 2020-09-12

## 2020-08-14 RX ORDER — METFORMIN HYDROCHLORIDE 850 MG/1
0 TABLET ORAL
Qty: 0 | Refills: 0 | DISCHARGE

## 2020-08-14 RX ORDER — LOSARTAN POTASSIUM 100 MG/1
1 TABLET, FILM COATED ORAL
Qty: 0 | Refills: 0 | DISCHARGE

## 2020-08-14 RX ORDER — METFORMIN HYDROCHLORIDE 850 MG/1
1 TABLET ORAL
Qty: 30 | Refills: 3
Start: 2020-08-14 | End: 2020-12-11

## 2020-08-14 RX ORDER — METFORMIN HYDROCHLORIDE 850 MG/1
1 TABLET ORAL
Qty: 30 | Refills: 0
Start: 2020-08-14 | End: 2020-09-12

## 2020-08-14 RX ORDER — OMEPRAZOLE 10 MG/1
1 CAPSULE, DELAYED RELEASE ORAL
Qty: 0 | Refills: 0 | DISCHARGE

## 2020-08-14 RX ORDER — PANTOPRAZOLE SODIUM 20 MG/1
1 TABLET, DELAYED RELEASE ORAL
Qty: 30 | Refills: 2
Start: 2020-08-14 | End: 2020-11-11

## 2020-08-14 RX ORDER — OXYCODONE HYDROCHLORIDE 5 MG/1
1 TABLET ORAL
Qty: 28 | Refills: 0
Start: 2020-08-14 | End: 2020-08-20

## 2020-08-14 RX ORDER — LOSARTAN POTASSIUM 100 MG/1
1 TABLET, FILM COATED ORAL
Qty: 30 | Refills: 0
Start: 2020-08-14 | End: 2020-09-12

## 2020-08-14 RX ADMIN — Medication 81 MILLIGRAM(S): at 12:39

## 2020-08-14 RX ADMIN — Medication 650 MILLIGRAM(S): at 06:03

## 2020-08-14 RX ADMIN — Medication 650 MILLIGRAM(S): at 07:54

## 2020-08-14 RX ADMIN — Medication 400 MILLIGRAM(S): at 16:12

## 2020-08-14 RX ADMIN — ENOXAPARIN SODIUM 40 MILLIGRAM(S): 100 INJECTION SUBCUTANEOUS at 12:38

## 2020-08-14 RX ADMIN — METFORMIN HYDROCHLORIDE 500 MILLIGRAM(S): 850 TABLET ORAL at 12:37

## 2020-08-14 RX ADMIN — LIDOCAINE 1 PATCH: 4 CREAM TOPICAL at 00:50

## 2020-08-14 RX ADMIN — Medication 650 MILLIGRAM(S): at 12:37

## 2020-08-14 RX ADMIN — Medication 1 MILLIGRAM(S): at 12:37

## 2020-08-14 RX ADMIN — LIDOCAINE 1 PATCH: 4 CREAM TOPICAL at 12:36

## 2020-08-14 RX ADMIN — Medication 400 MILLIGRAM(S): at 06:03

## 2020-08-14 RX ADMIN — Medication 650 MILLIGRAM(S): at 16:12

## 2020-08-14 RX ADMIN — PANTOPRAZOLE SODIUM 40 MILLIGRAM(S): 20 TABLET, DELAYED RELEASE ORAL at 06:03

## 2020-08-14 RX ADMIN — Medication 400 MILLIGRAM(S): at 14:21

## 2020-08-14 RX ADMIN — LOSARTAN POTASSIUM 25 MILLIGRAM(S): 100 TABLET, FILM COATED ORAL at 06:03

## 2020-08-14 RX ADMIN — Medication 400 MILLIGRAM(S): at 07:54

## 2020-08-14 NOTE — DISCHARGE NOTE PROVIDER - CARE PROVIDERS DIRECT ADDRESSES
,[email or web address],DirectAddress_Unknown

## 2020-08-14 NOTE — DISCHARGE NOTE NURSING/CASE MANAGEMENT/SOCIAL WORK - PATIENT PORTAL LINK FT
You can access the FollowMyHealth Patient Portal offered by [facility] by registering at the following website: [email or web address]. By joining [facility]’s FollowMyHealth portal, you will also be able to view your health information using other applications (apps) compatible with our system.

## 2020-08-14 NOTE — DISCHARGE NOTE PROVIDER - NSDCMRMEDTOKEN_GEN_ALL_CORE_FT
acetaminophen 325 mg oral tablet: 2 tab(s) orally every 6 hours, As Needed  aspirin 81 mg oral delayed release tablet: 1 tab(s) orally once a day  folic acid 1 mg oral tablet: 1 tab(s) orally once a day  Glucophage  mg oral tablet, extended release: 1 tab(s) orally once a day   ibuprofen 400 mg oral tablet: 1 tab(s) orally every 6 hours, As Needed  lidocaine 4% topical film: Apply topically to affected area once a day   losartan 25 mg oral tablet: 1 tab(s) orally once a day  omeprazole 20 mg oral delayed release capsule: 1 cap(s) orally once a day  oscal:   polyethylene glycol 3350 oral powder for reconstitution: 17 gram(s) orally once a day  senna oral tablet: 2 tab(s) orally once a day (at bedtime)  simvastatin 20 mg oral tablet: 1 tab(s) orally once a day (at bedtime)

## 2020-08-14 NOTE — PROGRESS NOTE ADULT - ASSESSMENT
89 yr old [place] female left pelvic pubic rami fx post fall  Patient has completed her rehabilitation and is scheduled to be discharged home today with home health PT.     Comorbidities MEDICAL ISSUES:  #anemia - Hgb 10.0, stable  - folic acid wnl, b12 wnl  #DM2   - metformin  #HTN   - losartan  #HLD TIA   - zocor, asa 81mg     -Pain control: Tylenol, ibuprofen, oxycodone PRN, lidoderm patch    -GI/Bowel Mgmt -  miralax, protonix, maalox or mylanta    -Bladder management: voids well    - DVT: Lovenox, TEDs     #Skin:  -No active issues at this time    FEN   Diet -  diabetic dash   [place] =speak louder as needed or pocket talker    osteoporosis by FX vit D level 37,,  calcium  Precautions / PROPHYLAXIS:      - Falls, Cardiac    - Ortho: Weight bearing status: wbat    Patient was seen and discussed with my attending, Dr. Portillo 89 yr old [place] female left pelvic pubic rami fx post fall  Patient has completed her rehabilitation and is scheduled to be discharged home today with home health PT.     Comorbidities MEDICAL ISSUES:  #anemia - Hgb 10.0, stable  - folic acid wnl, b12 wnl  #DM2   - metformin  #HTN   - losartan  #HLD TIA   - zocor, asa 81mg     -Pain control: Tylenol, ibuprofen, oxycodone PRN, lidoderm patch    -GI/Bowel Mgmt -  miralax, protonix, maalox or mylanta    -Bladder management: voids well    - DVT: ambulate     #Skin:  -No active issues at this time    FEN   Diet -  diabetic dash   [place] =speak louder as needed or pocket talker    osteoporosis by FX vit D level 37,,  calcium  Precautions / PROPHYLAXIS:      - Falls, Cardiac    - Ortho: Weight bearing status: wbat    Patient was seen and discussed with my attending, Dr. Portillo

## 2020-08-14 NOTE — CHART NOTE - NSCHARTNOTEFT_GEN_A_CORE
[facility] regarding prescription oxaydo ( oxycodone brand cannot be prepared as it is) and need to  new  E Script for oxycodone IR which was resent . ( as per family pt needs it to take at home for pain .

## 2020-08-14 NOTE — DISCHARGE NOTE PROVIDER - CARE PROVIDER_API CALL
Maikel Santiago  ORTHOPAEDIC SURGERY  [address] [place] [address]  Phone: [phone]  Fax: [phone]  Follow Up Time:     your pmd,   Phone: (   )    -  Fax: (   )    -  Follow Up Time:

## 2020-08-14 NOTE — PROGRESS NOTE ADULT - SUBJECTIVE AND OBJECTIVE BOX
Patient is a 89y old  Female who presents with a chief complaint of rehab of left pelvic FX (11 Aug 2020 09:59)    HPI:  89y Female with PMH of hard of hearing, Diabetes mellitus, Hypertension, TIAx1, came to the ED s/p Fall. Pt was walking down the stairs and after reaching the bottom step she lost her balance. Pt fell on her left side and complains of head, lower chest, wrist and leg pain. +HT, -LOC, -AC, GCS 15. Pt was not able to get up but was helped up to the chair when help arrived and then brought to the ED. Pt denies chest pain, sob, abd pain, nausea, vomiting, dizziness. Pt is unable to ambulate and ROM is restricted due to pain. Was DX w pelvic FX. pt was evaluated by physiatrist and deemed a good acute rehab candidate can tolerate and benefit from 3hrs of rehab to improve function for safe DC home    SUBJECTIVE  No acute overnight events. VSS. Patient states pain better than yesterday. She is looking forward to going home today    ROS unchanged from prior    OBJECTIVE    Functional Status: Ambulation: 40 ft w/ RW Steady Assist, 4 steps with 2 rails    PHYSICAL EXAM    Vital Signs Last 24 Hrs  T(C): 35.6 (14 Aug 2020 06:22), Max: 35.8 (13 Aug 2020 13:51)  T(F): 96.1 (14 Aug 2020 06:22), Max: 96.5 (13 Aug 2020 21:12)  HR: 90 (14 Aug 2020 06:22) (69 - 90)  BP: 115/78 (14 Aug 2020 06:22) (115/78 - 151/70)  BP(mean): --  RR: 18 (14 Aug 2020 06:22) (18 - 20)  SpO2: --    General: NAD,seen in the GYM daughter present, less pain today                        HEENT: NC/AT, EOM I, PERRLA, Normal Conjunctivae, fading ecchymosis left side of neck   Cardio: RRR, Normal S1-S2, No M/G/R                              Pulm: No Respiratory Distress,  Lungs CTAB                        Abdomen: ND/NT, Soft, BS+                                                MSK: No joint swelling, Full ROM, tender left rib cage, tender left hip                                       Ext: No C/C/E, Pulses 2+ throughout, No calf tenderness    Skin:  all skin intact                                                                 Wounds: none  Decubitus Ulcers: None Present     Neurological Examination    Cognitive: AAO x 3                                                                         Attention: Intact   Judgment: Good evidence of judgement                                   Mood/Affect: wnl                                                                           Communication:  Fluent,  No dysarthria   Swallow: intact  CN II - XII  intact  Coordination: FTN/HTS intact                                                                              Sensory: Intact to light touch, PP and Vibration                                                                                             Tone: normal Throughout   Balance: impaired    Motor    LEFT    UE: 4/5  RIGHT UE: 4/5  LEFT    LE:  4-/5  RIGHT LE: 4/5    Reflex:  2 + throughout    acetaminophen   Tablet .. 650 milliGRAM(s) Oral every 6 hours  aluminum hydroxide/magnesium hydroxide/simethicone Suspension 30 milliLiter(s) Oral every 4 hours PRN  aspirin enteric coated 81 milliGRAM(s) Oral daily  dextrose 40% Gel 15 Gram(s) Oral once PRN  dextrose 5%. 1000 milliLiter(s) IV Continuous <Continuous>  dextrose 50% Injectable 12.5 Gram(s) IV Push once  dextrose 50% Injectable 25 Gram(s) IV Push once  dextrose 50% Injectable 25 Gram(s) IV Push once  enoxaparin Injectable 40 milliGRAM(s) SubCutaneous daily  folic acid 1 milliGRAM(s) Oral daily  glucagon  Injectable 1 milliGRAM(s) IntraMuscular once PRN  ibuprofen  Tablet. 400 milliGRAM(s) Oral every 8 hours  lidocaine   Patch 1 Patch Transdermal daily  losartan 25 milliGRAM(s) Oral daily  metFORMIN 500 milliGRAM(s) Oral daily  pantoprazole    Tablet 40 milliGRAM(s) Oral before breakfast  polyethylene glycol 3350 17 Gram(s) Oral daily  senna 2 Tablet(s) Oral at bedtime  simvastatin 20 milliGRAM(s) Oral at bedtime      RECENT LABS/IMAGING                        10.0   5.25  )-----------( 242      ( 10 Aug 2020 07:52 )             29.7     08-10    136  |  98  |  17  ----------------------------<  220<H>  4.7   |  27  |  1.1    Ca    9.3      10 Aug 2020 07:52    TPro  7.0  /  Alb  4.2  /  TBili  0.5  /  DBili  x   /  AST  21  /  ALT  21  /  AlkPhos  72  08-10      anemia Patient is a 89y old  Female who presents with a chief complaint of rehab of left pelvic FX (11 Aug 2020 09:59)    HPI:  89y Female with PMH of hard of hearing, Diabetes mellitus, Hypertension, TIAx1, came to the ED s/p Fall. Pt was walking down the stairs and after reaching the bottom step she lost her balance. Pt fell on her left side and complains of head, lower chest, wrist and leg pain. +HT, -LOC, -AC, GCS 15. Pt was not able to get up but was helped up to the chair when help arrived and then brought to the ED. Pt denies chest pain, sob, abd pain, nausea, vomiting, dizziness. Pt is unable to ambulate and ROM is restricted due to pain. Was DX w pelvic FX. pt was evaluated by physiatrist and deemed a good acute rehab candidate can tolerate and benefit from 3hrs of rehab to improve function for safe DC home    SUBJECTIVE  No acute overnight events. VSS. Patient states pain better than yesterday. She is looking forward to going home today    ROS unchanged from prior    OBJECTIVE    Functional Status: Ambulation: 40 ft w/ RW Steady Assist, 4 steps with 2 rails    PHYSICAL EXAM    Vital Signs Last 24 Hrs  T(C): 35.6 (14 Aug 2020 06:22), Max: 35.8 (13 Aug 2020 13:51)  T(F): 96.1 (14 Aug 2020 06:22), Max: 96.5 (13 Aug 2020 21:12)  HR: 90 (14 Aug 2020 06:22) (69 - 90)  BP: 115/78 (14 Aug 2020 06:22) (115/78 - 151/70)  BP(mean): --  RR: 18 (14 Aug 2020 06:22) (18 - 20)  SpO2: --    General: NAD,seen in the GYM , less pain  HEENT: NC/AT, EOM I, PERRLA, Normal Conjunctivae, fading ecchymosis left side of neck   Cardio: RRR, Normal S1-S2, No M/G/R                              Pulm: No Respiratory Distress,  Lungs CTAB                        Abdomen: ND/NT, Soft, BS+                                                MSK: No joint swelling, Full ROM, tender left rib cage, tender left hip                                       Ext: No C/C/E, Pulses 2+ throughout, No calf tenderness    Skin:  all skin intact                                                                 Wounds: none  Decubitus Ulcers: None Present     Neurological Examination    Cognitive: AAO x 3                                                                         Attention: Intact   Judgment: Good evidence of judgement                                   Mood/Affect: wnl                                                                           Communication:  Fluent,  No dysarthria   Swallow: intact  CN II - XII  intact  Coordination: FTN/HTS intact                                                                              Sensory: Intact to light touch, PP and Vibration                                                                                             Tone: normal Throughout   Balance: impaired    Motor    LEFT    UE: 4/5  RIGHT UE: 4/5  LEFT    LE:  4-/5  RIGHT LE: 4/5    Reflex:  2 + throughout    acetaminophen   Tablet .. 650 milliGRAM(s) Oral every 6 hours  aluminum hydroxide/magnesium hydroxide/simethicone Suspension 30 milliLiter(s) Oral every 4 hours PRN  aspirin enteric coated 81 milliGRAM(s) Oral daily  dextrose 40% Gel 15 Gram(s) Oral once PRN  dextrose 5%. 1000 milliLiter(s) IV Continuous <Continuous>  dextrose 50% Injectable 12.5 Gram(s) IV Push once  dextrose 50% Injectable 25 Gram(s) IV Push once  dextrose 50% Injectable 25 Gram(s) IV Push once  enoxaparin Injectable 40 milliGRAM(s) SubCutaneous daily  folic acid 1 milliGRAM(s) Oral daily  glucagon  Injectable 1 milliGRAM(s) IntraMuscular once PRN  ibuprofen  Tablet. 400 milliGRAM(s) Oral every 8 hours  lidocaine   Patch 1 Patch Transdermal daily  losartan 25 milliGRAM(s) Oral daily  metFORMIN 500 milliGRAM(s) Oral daily  pantoprazole    Tablet 40 milliGRAM(s) Oral before breakfast  polyethylene glycol 3350 17 Gram(s) Oral daily  senna 2 Tablet(s) Oral at bedtime  simvastatin 20 milliGRAM(s) Oral at bedtime      RECENT LABS/IMAGING                        10.0   5.25  )-----------( 242      ( 10 Aug 2020 07:52 )             29.7     08-10    136  |  98  |  17  ----------------------------<  220<H>  4.7   |  27  |  1.1    Ca    9.3      10 Aug 2020 07:52    TPro  7.0  /  Alb  4.2  /  TBili  0.5  /  DBili  x   /  AST  21  /  ALT  21  /  AlkPhos  72  08-10      anemia

## 2020-08-14 NOTE — PROGRESS NOTE ADULT - REASON FOR ADMISSION
rehab of left pelvic FX

## 2020-08-14 NOTE — DISCHARGE NOTE PROVIDER - HOSPITAL COURSE
Patient is a 89y old  Female who presented to of rehab  for rehab of left pelvic FX (11 Aug 2020 09:59)        she is a 89 yrs old  Female with PMH of hard of hearing, Diabetes mellitus, Hypertension, TIAx1, came to the ED s/p Fall. Pt was walking down the stairs and after reaching the bottom step she lost her balance. Pt fell on her left side and complains of head, lower chest, wrist and leg pain. +HT, -LOC, -AC, GCS 15. Pt was not able to get up but was helped up to the chair when help arrived and then brought to the ED. Pt denies chest pain, sob, abd pain, nausea, vomiting, dizziness. Pt  was unable to ambulate and ROM is restricted due to pain. Was DX w pelvic FX. pt was evaluated by physiatrist and deemed a good acute rehab candidate can tolerate and benefit from 3hrs of rehab to improve function for safe DC home     In rehab she participated in therapy . She is Weight bearing as tolerated . She still has some moderate left pelvic pain, improved from yesterday. She is scheduled to be discharged to home today .  Her pain is controlled with Pain controlled with  Tylenol, ibuprofen,  lidoderm patch.   For  her other health condition her home medicines were continued . she had no complications  during rehab stay           Current Functional Status: Ambulation: 40 ft w/ RW Steady Assist, 4 steps with 2 rails             she is being discharged from rehab , advised to follow up with ortho and pmd in 1 to 2 weeks         -

## 2020-08-14 NOTE — DISCHARGE NOTE PROVIDER - NSDCCPCAREPLAN_GEN_ALL_CORE_FT
PRINCIPAL DISCHARGE DIAGNOSIS  Diagnosis: Closed pelvic fracture  Assessment and Plan of Treatment: you were seen by trauma surgery team and ortho team.you have pelvic fractures . as per rtho team  you can put weight on your legs and calk,please continue to do physical therapy ypu are advised,may take painmeds tylenol or motrin . motrin or advil if you take please take with food or pepcid . need to follow up with your ortho doctor in 2 to 4 weeks ,      SECONDARY DISCHARGE DIAGNOSES  Diagnosis: DM (diabetes mellitus)  Assessment and Plan of Treatment: continue meds and diet as advised    Diagnosis: Hypertension  Assessment and Plan of Treatment: continue meds and diet control

## 2020-08-18 DIAGNOSIS — D64.9 ANEMIA, UNSPECIFIED: ICD-10-CM
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
## 2020-08-18 DIAGNOSIS — S32.502D UNSPECIFIED FRACTURE OF LEFT PUBIS, SUBSEQUENT ENCOUNTER FOR FRACTURE WITH ROUTINE HEALING: ICD-10-CM

## 2020-08-18 DIAGNOSIS — Z86.73 PERSONAL HISTORY OF TRANSIENT ISCHEMIC ATTACK (TIA), AND CEREBRAL INFARCTION WITHOUT RESIDUAL DEFICITS: ICD-10-CM
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
## 2020-08-18 DIAGNOSIS — M81.0 AGE-RELATED OSTEOPOROSIS WITHOUT CURRENT PATHOLOGICAL FRACTURE: ICD-10-CM

## 2020-08-18 DIAGNOSIS — I10 ESSENTIAL (PRIMARY) HYPERTENSION: ICD-10-CM

## 2020-08-18 DIAGNOSIS — E78.5 HYPERLIPIDEMIA, UNSPECIFIED: ICD-10-CM

## 2020-08-18 DIAGNOSIS — E11.9 TYPE 2 DIABETES MELLITUS WITHOUT COMPLICATIONS: ICD-10-CM

## 2020-08-18 DIAGNOSIS — Z79.84 LONG TERM (CURRENT) USE OF ORAL HYPOGLYCEMIC DRUGS: ICD-10-CM

## 2022-05-11 PROBLEM — Z00.00 ENCOUNTER FOR PREVENTIVE HEALTH EXAMINATION: Status: ACTIVE | Noted: 2022-05-11

## 2022-05-12 ENCOUNTER — APPOINTMENT (OUTPATIENT)
Dept: SPEECH THERAPY | Facility: CLINIC | Age: 87
End: 2022-05-12

## 2022-05-12 ENCOUNTER — OUTPATIENT (OUTPATIENT)
Dept: OUTPATIENT SERVICES | Facility: HOSPITAL | Age: 87
LOS: 1 days | Discharge: HOME | End: 2022-05-12

## 2022-05-12 DIAGNOSIS — H90.8 MIXED CONDUCTIVE AND SENSORINEURAL HEARING LOSS, UNSPECIFIED: ICD-10-CM

## 2022-11-11 ENCOUNTER — NON-APPOINTMENT (OUTPATIENT)
Age: 87
End: 2022-11-11

## 2022-11-11 ENCOUNTER — EMERGENCY (EMERGENCY)
Facility: HOSPITAL | Age: 87
LOS: 0 days | Discharge: HOME | End: 2022-11-12
Attending: EMERGENCY MEDICINE | Admitting: EMERGENCY MEDICINE

## 2022-11-11 VITALS
HEART RATE: 71 BPM | SYSTOLIC BLOOD PRESSURE: 161 MMHG | OXYGEN SATURATION: 98 % | RESPIRATION RATE: 20 BRPM | DIASTOLIC BLOOD PRESSURE: 98 MMHG | TEMPERATURE: 98 F

## 2022-11-11 DIAGNOSIS — R50.9 FEVER, UNSPECIFIED: ICD-10-CM

## 2022-11-11 DIAGNOSIS — Z79.82 LONG TERM (CURRENT) USE OF ASPIRIN: ICD-10-CM

## 2022-11-11 DIAGNOSIS — I10 ESSENTIAL (PRIMARY) HYPERTENSION: ICD-10-CM

## 2022-11-11 DIAGNOSIS — E78.5 HYPERLIPIDEMIA, UNSPECIFIED: ICD-10-CM

## 2022-11-11 DIAGNOSIS — R05.9 COUGH, UNSPECIFIED: ICD-10-CM

## 2022-11-11 DIAGNOSIS — U07.1 COVID-19: ICD-10-CM

## 2022-11-11 PROCEDURE — 99284 EMERGENCY DEPT VISIT MOD MDM: CPT

## 2022-11-11 NOTE — ED PROVIDER NOTE - ATTENDING CONTRIBUTION TO CARE
89-year-old female with PMH HTN, HLD presents for evaluation of cough and fever x 1 day.  Patient with T-max 101.5.  Patient denies any chest discomfort, shortness of breath, malaise or weakness.  Patient is active woman who lives at home with family.  Denies any vomiting, diarrhea or abdominal pain.  Patient and nephew at bedside requesting viral testing and chest x-ray only, declined any labs.  Patient without any active complaints on evaluation.    VITAL SIGNS: noted  CONSTITUTIONAL: Well-developed; well-nourished; in no acute distress  HEAD: Normocephalic; atraumatic  EYES: PERRL, EOM intact; conjunctiva and sclera clear  ENT: No nasal discharge; airway clear. MMM  NECK: Supple; non tender. No anterior cervical lymphadenopathy noted  CARD: S1, S2 normal; no murmurs, gallops, or rubs. Regular rate and rhythm  RESP: CTAB/L, no wheezes, rales or rhonchi  ABD: Normal bowel sounds; soft; non-distended; non-tender; no hepatosplenomegaly. No CVA tenderness  EXT: Normal ROM. No calf tenderness or edema. Distal pulses intact  NEURO: Alert, oriented. Grossly unremarkable. No focal deficits  SKIN: Skin exam is warm and dry, no acute rash

## 2022-11-11 NOTE — ED PROVIDER NOTE - NS ED ROS FT
Constitutional: Reports fever  HEENT: No headache  Cardiac:  No chest pain, SOB, leg edema, or leg pain.  Respiratory: Reports cough. No respiratory distress, or hemoptysis.  GI:  No nausea, vomiting, diarrhea, or abdominal pain.  :  No dysuria, frequency, or urgency.  MS:  No myalgia, muscle weakness   Neuro:  No dizziness, LOC, paralysis, or N/T.  Skin:  No skin rash.   Endocrine: No polyuria, polyphagia, or polydipsia.

## 2022-11-11 NOTE — ED PROVIDER NOTE - PATIENT PORTAL LINK FT
You can access the FollowMyHealth Patient Portal offered by [facility] by registering at the following website: [email or web address]. By joining [facility]’s FollowMyHealth portal, you will also be able to view your health information using other applications (apps) compatible with our system.

## 2022-11-11 NOTE — ED PROVIDER NOTE - OBJECTIVE STATEMENT
89 yo female w/ PMH of HTN, HLD (on losartan and simvastatin) presents for cough and fever x 1 day.  Tm 101.5, has been taking Tylenol for fever. No chest pain, SOB, N/V, abdominal pain, diarrhea, urinary sxs. No sick contacts. Vaccinated for COVID but not flu.

## 2022-11-11 NOTE — ED PROVIDER NOTE - NSFOLLOWUPINSTRUCTIONS_ED_ALL_ED_FT
Viral Respiratory Infection    A viral respiratory infection is an illness that affects parts of the body used for breathing, like the lungs, nose, and throat. It is caused by a germ called a virus. Symptoms can include runny nose, coughing, sneezing, fatigue, body aches, sore throat, fever, or headache. Over the counter medicine can be used to manage the symptoms but the infection itself goes away on its own.     SEEK IMMEDIATE MEDICAL CARE IF YOU HAVE THE FOLLOWING SYMPTOMS: shortness of breath, chest pain, fever over 10 days, lightheadedness/dizziness. FOLLOW UP WITH YOUR DOCTOR THIS WEEK FOR REEVALUATION.      RETURN TO ED IMMEDIATELY WITH ANY WORSENING SYMPTOMS, CHEST PAIN, SHORTNESS OF BREATH, ABDOMINAL PAIN, FEVERS, WEAKNESS, DIZZINESS, PERSISTENT OR SEVERE HEADACHE OR ANY OTHER CONCERNS.    PLEASE CALL FOR SCHEDULING MONOCLONAL ANTIBODY INFUSION -[phone]     Viral Respiratory Infection    A viral respiratory infection is an illness that affects parts of the body used for breathing, like the lungs, nose, and throat. It is caused by a germ called a virus. Symptoms can include runny nose, coughing, sneezing, fatigue, body aches, sore throat, fever, or headache. Over the counter medicine can be used to manage the symptoms but the infection itself goes away on its own.     SEEK IMMEDIATE MEDICAL CARE IF YOU HAVE THE FOLLOWING SYMPTOMS: shortness of breath, chest pain, fever over 10 days, lightheadedness/dizziness.

## 2022-11-11 NOTE — ED PROVIDER NOTE - PHYSICAL EXAMINATION
GENERAL: NAD   SKIN: warm, dry  HEAD: Normocephalic; atraumatic.  EYES: PERRLA, EOMI, no conjunctival erythema  ENT: Oropharynx WNL   CARD: S1, S2 normal; no murmurs, gallops, or rubs. Regular rate and rhythm.   RESP: LCTAB; No wheezes, rales, rhonchi, or stridor.  ABD: soft, nontender, and nondistended  NEURO: Alert, oriented, grossly unremarkable  PSYCH: Cooperative, appropriate.

## 2022-11-11 NOTE — ED PROVIDER NOTE - CLINICAL SUMMARY MEDICAL DECISION MAKING FREE TEXT BOX
Patient evaluated for cough and fever, chest x-ray without consolidation noted, viral testing sent and patient found to have COVID.  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  Declining any labs or further evaluation.  Patient without shortness of breath or chest pain, feels well and patient is well-appearing.  Patient advised supportive care at home and follow-up with PMD and agrees.  Strict return precautions advised and patient and family verbalized understanding

## 2022-11-11 NOTE — ED PROVIDER NOTE - NSFOLLOWUPCLINICS_GEN_ALL_ED_FT
A Family Medicine Doctor  Family Medicine  .  [place]   Phone:   Fax:   Established Patient  Follow Up Time: 1-3 Days     A Family Medicine Doctor  Family Medicine  .  [place]   Phone:   Fax:   Established Patient  Follow Up Time: 1-3 [place] [address]  Phone: [phone]  Fax:   Follow Up Time: 1-3 Days

## 2022-11-12 ENCOUNTER — TRANSCRIPTION ENCOUNTER (OUTPATIENT)
Age: 87
End: 2022-11-12

## 2022-11-12 LAB
FLUAV AG NPH QL: SIGNIFICANT CHANGE UP
FLUBV AG NPH QL: SIGNIFICANT CHANGE UP
RSV RNA NPH QL NAA+NON-PROBE: SIGNIFICANT CHANGE UP
SARS-COV-2 RNA SPEC QL NAA+PROBE: DETECTED

## 2022-11-12 PROCEDURE — 71045 X-RAY EXAM CHEST 1 VIEW: CPT | Mod: 26

## 2022-11-14 ENCOUNTER — EMERGENCY (EMERGENCY)
Facility: HOSPITAL | Age: 87
LOS: 1 days | Discharge: HOME | End: 2022-11-14
Admitting: EMERGENCY MEDICINE
Payer: COMMERCIAL

## 2022-11-14 ENCOUNTER — APPOINTMENT (OUTPATIENT)
Age: 87
End: 2022-11-14

## 2022-11-14 VITALS — WEIGHT: 119.93 LBS | HEIGHT: 63 IN

## 2022-11-14 VITALS
TEMPERATURE: 99 F | SYSTOLIC BLOOD PRESSURE: 116 MMHG | OXYGEN SATURATION: 95 % | HEART RATE: 72 BPM | RESPIRATION RATE: 18 BRPM | DIASTOLIC BLOOD PRESSURE: 73 MMHG

## 2022-11-14 PROCEDURE — L9998: CPT

## 2022-11-14 RX ORDER — BEBTELOVIMAB 87.5 MG/ML
175 INJECTION, SOLUTION INTRAVENOUS ONCE
Refills: 0 | Status: COMPLETED | OUTPATIENT
Start: 2022-11-14 | End: 2022-11-14

## 2022-11-14 RX ADMIN — BEBTELOVIMAB 175 MILLIGRAM(S): 87.5 INJECTION, SOLUTION INTRAVENOUS at 10:38

## 2022-11-14 NOTE — CHART NOTE - NSCHARTNOTEFT_GEN_A_CORE
Monoclonal Antibody Infusion    Symptoms: sore throat, fever   High Risk Factors: age greater than 65 years of age   Allergies: none     Home Medications:   simvastatin, losartan      Exam:   Appearance: NAD  HEENt: Normal oral mucosa  Lymphatic: No lymphadenopathy  Cardiovascular: Normal S1 S2, no acute cardiac distress noted  Respiratory: Lungs clear to auscultation   Gastrointestinal : soft, non-tender, + BS  Skin: warm and dry   Neurological: non-focal   Extremities: Normal range of motion    Vital Signs:   BP: 139/73  Temp: 98.2  02 sat: 98  HR: 73    Assessment & Plan:   I have reviewed the Bebtelovimab infusion Emergency Use Authorization and I have provided the patients or the patient's caregiver with the following information:   1.) FDA has authorized emergency use Bebtelovimab, which is not an FDA-approved biological product  2.)The patients or patient's caregiver has the option to accept or refuse administration of Bebtelovimab  3.) The significant known and potential risks and benefits of Bebtelovimab and the extent to which such risks and benefits are unknown  4.) Information on available alternative  treatments and risks and benefits of those alternatives    89 years old    Allergies: none    Pt tested positive for COVID-19 on:    Symptoms=sore throat, fever    Vaccine=pfizer x3     PMH= HTN, HLD    PLAN:   Infusion procedure explained to patient-consent for monoclonal antibody infusion obtained from patient's son Jay- risks and benefits discussed/all questions answered -infuse Bebtelovimab IV 1x IV push-observe patient for one hour post infusion and discharge home.      Consent form thoroughly reviewed and signed     All questions answered    Pt is okay with plan    Pt tolerated MAB infusion well    No acute distress, noted    Pt advised to follow-up with PCP soon    ER percautions if symptoms worsen Monoclonal Antibody Infusion    Symptoms: sore throat, fever   High Risk Factors: age greater than 65 years of age   Allergies: none     Home Medications:   simvastatin, losartan      Exam:   Appearance: NAD  HEENt: Normal oral mucosa  Lymphatic: No lymphadenopathy  Cardiovascular: Normal S1 S2, no acute cardiac distress noted  Respiratory: Lungs clear to auscultation   Gastrointestinal : soft, non-tender, + BS  Skin: warm and dry   Neurological: non-focal   Extremities: Normal range of motion    Vital Signs:   BP: 139/73  Temp: 98.2  02 sat: 98  HR: 73    Assessment & Plan:   I have reviewed the Bebtelovimab infusion Emergency Use Authorization and I have provided the patients or the patient's caregiver with the following information:   1.) FDA has authorized emergency use Bebtelovimab, which is not an FDA-approved biological product  2.)The patients or patient's caregiver has the option to accept or refuse administration of Bebtelovimab  3.) The significant known and potential risks and benefits of Bebtelovimab and the extent to which such risks and benefits are unknown  4.) Information on available alternative  treatments and risks and benefits of those alternatives    89 years old    Allergies: none    Pt tested positive for COVID-19 on: 11/14/22    Symptoms=sore throat, fever    Vaccine=pfizer x3     PMH= HTN, HLD    PLAN:   Infusion procedure explained to patient-consent for monoclonal antibody infusion obtained from patient's son Jay- risks and benefits discussed/all questions answered -infuse Bebtelovimab IV 1x IV push-observe patient for one hour post infusion and discharge home.      Consent form thoroughly reviewed and signed     All questions answered    Pt is okay with plan    Pt tolerated MAB infusion well    No acute distress, noted    Pt advised to follow-up with PCP soon    ER percautions if symptoms worsen

## 2022-11-17 DIAGNOSIS — E78.5 HYPERLIPIDEMIA, UNSPECIFIED: ICD-10-CM

## 2022-11-17 DIAGNOSIS — J02.9 ACUTE PHARYNGITIS, UNSPECIFIED: ICD-10-CM

## 2022-11-17 DIAGNOSIS — I10 ESSENTIAL (PRIMARY) HYPERTENSION: ICD-10-CM

## 2022-11-17 DIAGNOSIS — U07.1 COVID-19: ICD-10-CM

## 2024-03-28 NOTE — OCCUPATIONAL THERAPY INITIAL EVALUATION ADULT - LEVEL OF INDEPENDENCE: SCOOT/BRIDGE, REHAB EVAL
Detail Level: Zone
Add High Risk Medication Management Associated Diagnosis?: No
dependent (less than 25% patients effort)

## 2025-03-31 NOTE — OCCUPATIONAL THERAPY INITIAL EVALUATION ADULT - EATING, PREVIOUS LEVEL OF FUNCTION, OT EVAL
independent What Is The Reason For Today's Visit?: Full Body Skin Examination What Is The Reason For Today's Visit? (Being Monitored For X): the development of new lesions